# Patient Record
Sex: FEMALE | Race: WHITE | HISPANIC OR LATINO | Employment: OTHER | URBAN - METROPOLITAN AREA
[De-identification: names, ages, dates, MRNs, and addresses within clinical notes are randomized per-mention and may not be internally consistent; named-entity substitution may affect disease eponyms.]

---

## 2022-07-06 ENCOUNTER — HOSPITAL ENCOUNTER (OUTPATIENT)
Dept: RADIOLOGY | Facility: MEDICAL CENTER | Age: 62
End: 2022-07-06
Attending: STUDENT IN AN ORGANIZED HEALTH CARE EDUCATION/TRAINING PROGRAM
Payer: COMMERCIAL

## 2022-07-06 DIAGNOSIS — N63.0 MASS OF BREAST: ICD-10-CM

## 2022-07-06 PROCEDURE — G0279 TOMOSYNTHESIS, MAMMO: HCPCS

## 2022-11-11 ENCOUNTER — TELEPHONE (OUTPATIENT)
Dept: HEALTH INFORMATION MANAGEMENT | Facility: OTHER | Age: 62
End: 2022-11-11
Payer: COMMERCIAL

## 2022-11-11 NOTE — TELEPHONE ENCOUNTER
"Received a call from [facility] for [facility]. [facility] had Soledad on speaker. HIPAA verified and per Soledad \"no\" to the covid screening. Per [facility], effective date is 01/01/23. Scheduled QSHA on 11/14/22 at 2 pm with Dr. Calixto at [address] [place] [address]. Soledad will call back throughout this month and next month to find a provider at [place]. No other questions or concerns.  "

## 2022-11-14 PROBLEM — R73.02 IGT (IMPAIRED GLUCOSE TOLERANCE): Status: ACTIVE | Noted: 2022-11-14

## 2022-11-14 PROBLEM — M19.041 PRIMARY OSTEOARTHRITIS OF BOTH HANDS: Status: ACTIVE | Noted: 2022-11-14

## 2022-11-14 PROBLEM — M19.042 PRIMARY OSTEOARTHRITIS OF BOTH HANDS: Status: ACTIVE | Noted: 2022-11-14

## 2022-11-14 PROBLEM — Z90.49 HX OF CHOLECYSTECTOMY: Status: ACTIVE | Noted: 2022-11-14

## 2022-11-14 PROBLEM — K21.9 GERD (GASTROESOPHAGEAL REFLUX DISEASE): Status: ACTIVE | Noted: 2022-11-14

## 2022-12-02 ENCOUNTER — OFFICE VISIT (OUTPATIENT)
Dept: URGENT CARE | Facility: CLINIC | Age: 62
End: 2022-12-02
Payer: COMMERCIAL

## 2022-12-02 VITALS
HEART RATE: 68 BPM | HEIGHT: 62 IN | SYSTOLIC BLOOD PRESSURE: 118 MMHG | BODY MASS INDEX: 23.37 KG/M2 | TEMPERATURE: 98 F | DIASTOLIC BLOOD PRESSURE: 72 MMHG | RESPIRATION RATE: 16 BRPM | OXYGEN SATURATION: 95 % | WEIGHT: 127 LBS

## 2022-12-02 DIAGNOSIS — R05.1 ACUTE COUGH: ICD-10-CM

## 2022-12-02 DIAGNOSIS — J98.8 RTI (RESPIRATORY TRACT INFECTION): ICD-10-CM

## 2022-12-02 PROCEDURE — 99213 OFFICE O/P EST LOW 20 MIN: CPT | Performed by: STUDENT IN AN ORGANIZED HEALTH CARE EDUCATION/TRAINING PROGRAM

## 2022-12-02 RX ORDER — BENZONATATE 100 MG/1
100 CAPSULE ORAL 3 TIMES DAILY PRN
Qty: 60 CAPSULE | Refills: 0 | Status: SHIPPED | OUTPATIENT
Start: 2022-12-02 | End: 2023-01-26

## 2022-12-02 RX ORDER — AZITHROMYCIN 250 MG/1
TABLET, FILM COATED ORAL
Qty: 6 TABLET | Refills: 0 | Status: SHIPPED | OUTPATIENT
Start: 2022-12-02 | End: 2023-01-26

## 2022-12-02 ASSESSMENT — ENCOUNTER SYMPTOMS
NAUSEA: 0
VOMITING: 0
SHORTNESS OF BREATH: 0
HEADACHES: 1
HEARTBURN: 0
DIZZINESS: 0
COUGH: 1
SORE THROAT: 1
WHEEZING: 0
SPUTUM PRODUCTION: 1
ABDOMINAL PAIN: 0
PALPITATIONS: 0
DIARRHEA: 0
MYALGIAS: 0
HEMOPTYSIS: 0
FEVER: 0
CONSTIPATION: 0
CHILLS: 0

## 2022-12-02 NOTE — PROGRESS NOTES
"Subjective     Soledad Jordan is a 62 y.o. female who presents with Cough (Cough , lots of phlegm , loss of voice , headache x 1 week )            Cough  This is a new problem. Episode onset: 1 week. The problem has been unchanged. The cough is Productive of sputum. Associated symptoms include headaches, nasal congestion, postnasal drip and a sore throat. Pertinent negatives include no chest pain, chills, ear congestion, ear pain, fever, heartburn, hemoptysis, myalgias, shortness of breath or wheezing. Treatments tried: DayQuil, humidifer, honey. The treatment provided mild relief.     Review of Systems   Constitutional:  Negative for chills, fever and malaise/fatigue.   HENT:  Positive for congestion, postnasal drip and sore throat. Negative for ear pain.    Respiratory:  Positive for cough and sputum production. Negative for hemoptysis, shortness of breath and wheezing.    Cardiovascular:  Negative for chest pain and palpitations.   Gastrointestinal:  Negative for abdominal pain, constipation, diarrhea, heartburn, nausea and vomiting.   Musculoskeletal:  Negative for myalgias.   Neurological:  Positive for headaches. Negative for dizziness.   All other systems reviewed and are negative.           Objective     /72 (BP Location: Left arm, Patient Position: Sitting, BP Cuff Size: Adult)   Pulse 68   Temp 36.7 °C (98 °F) (Temporal)   Resp 16   Ht 1.575 m (5' 2\")   Wt 57.6 kg (127 lb)   SpO2 95%   BMI 23.23 kg/m²      Physical Exam  Vitals reviewed.   Constitutional:       General: She is not in acute distress.     Appearance: Normal appearance. She is not ill-appearing or toxic-appearing.   HENT:      Head: Normocephalic and atraumatic.      Nose: Nose normal.      Mouth/Throat:      Mouth: Mucous membranes are moist.      Pharynx: Oropharynx is clear.   Eyes:      Extraocular Movements: Extraocular movements intact.      Conjunctiva/sclera: Conjunctivae normal.      Pupils: Pupils are equal, round, " and reactive to light.   Cardiovascular:      Rate and Rhythm: Normal rate and regular rhythm.   Pulmonary:      Effort: Pulmonary effort is normal. No respiratory distress.      Breath sounds: Normal breath sounds. No stridor. No wheezing, rhonchi or rales.   Skin:     General: Skin is warm and dry.   Neurological:      General: No focal deficit present.      Mental Status: She is alert. Mental status is at baseline.                           Assessment & Plan        1. RTI (respiratory tract infection)  - azithromycin (ZITHROMAX) 250 MG Tab; Take 2 tablets by mouth on day one. Take one tablet by mouth the remaining days until gone  Dispense: 6 Tablet; Refill: 0    2. Acute cough  - benzonatate (TESSALON) 100 MG Cap; Take 1 Capsule by mouth 3 times a day as needed for Cough.  Dispense: 60 Capsule; Refill: 0     Given duration of symptoms of 1 week without improvement with supportive care measures, patient be started on azithromycin.  Patient otherwise well-appearing in clinic today and physical exam within normal limits.  Vital signs are stable.    Differential diagnoses, supportive care measures (rest, hydration, nasal saline rinses/sprays, throat lozenges, OTC Tylenol/ibuprofen, humidified air) and indications for immediate follow-up discussed with patient. Pathogenesis of diagnosis discussed including typical length and natural progression.      Instructed to return to urgent care or nearest emergency department if symptoms fail to improve, for any change in condition, further concerns, or new concerning symptoms.    Patient states understanding and agrees with the plan of care and discharge instructions.

## 2023-01-26 ENCOUNTER — OFFICE VISIT (OUTPATIENT)
Dept: MEDICAL GROUP | Facility: PHYSICIAN GROUP | Age: 63
End: 2023-01-26
Payer: MEDICARE

## 2023-01-26 VITALS
SYSTOLIC BLOOD PRESSURE: 118 MMHG | OXYGEN SATURATION: 98 % | BODY MASS INDEX: 23.05 KG/M2 | RESPIRATION RATE: 16 BRPM | WEIGHT: 125.25 LBS | HEART RATE: 61 BPM | TEMPERATURE: 98 F | HEIGHT: 62 IN | DIASTOLIC BLOOD PRESSURE: 72 MMHG

## 2023-01-26 DIAGNOSIS — M19.041 PRIMARY OSTEOARTHRITIS OF BOTH HANDS: ICD-10-CM

## 2023-01-26 DIAGNOSIS — N89.8 VAGINAL DRYNESS: ICD-10-CM

## 2023-01-26 DIAGNOSIS — K21.9 GASTROESOPHAGEAL REFLUX DISEASE WITHOUT ESOPHAGITIS: ICD-10-CM
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
## 2023-01-26 DIAGNOSIS — Z13.6 SCREENING FOR CARDIOVASCULAR CONDITION: ICD-10-CM

## 2023-01-26 DIAGNOSIS — R53.83 OTHER FATIGUE: ICD-10-CM

## 2023-01-26 DIAGNOSIS — M19.042 PRIMARY OSTEOARTHRITIS OF BOTH HANDS: ICD-10-CM

## 2023-01-26 DIAGNOSIS — Z23 NEED FOR VACCINATION: ICD-10-CM

## 2023-01-26 DIAGNOSIS — R73.02 IGT (IMPAIRED GLUCOSE TOLERANCE): ICD-10-CM

## 2023-01-26 DIAGNOSIS — Z78.0 POSTMENOPAUSAL: ICD-10-CM

## 2023-01-26 PROBLEM — Z90.49 HX OF CHOLECYSTECTOMY: Status: RESOLVED | Noted: 2022-11-14 | Resolved: 2023-01-26
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
## 2023-01-26 PROCEDURE — 90471 IMMUNIZATION ADMIN: CPT | Performed by: PHYSICIAN ASSISTANT

## 2023-01-26 PROCEDURE — 99204 OFFICE O/P NEW MOD 45 MIN: CPT | Mod: 25 | Performed by: PHYSICIAN ASSISTANT

## 2023-01-26 PROCEDURE — 90715 TDAP VACCINE 7 YRS/> IM: CPT | Performed by: PHYSICIAN ASSISTANT

## 2023-01-26 ASSESSMENT — ENCOUNTER SYMPTOMS
BRUISES/BLEEDS EASILY: 0
HEADACHES: 0
FEVER: 0
DIARRHEA: 0
COUGH: 0
PALPITATIONS: 0
VOMITING: 0
DIAPHORESIS: 0
MYALGIAS: 0
WEAKNESS: 0
ORTHOPNEA: 0
SHORTNESS OF BREATH: 0
DIZZINESS: 0
ABDOMINAL PAIN: 0
BLURRED VISION: 0
NAUSEA: 0
CHILLS: 0
NERVOUS/ANXIOUS: 0
DEPRESSION: 0
FALLS: 0
WEIGHT LOSS: 0
SORE THROAT: 0

## 2023-01-26 ASSESSMENT — PATIENT HEALTH QUESTIONNAIRE - PHQ9: CLINICAL INTERPRETATION OF PHQ2 SCORE: 0

## 2023-01-26 NOTE — PROGRESS NOTES
Annual Health Assessment Questions:    1.  Are you currently engaging in any exercise or physical activity? Yes, walking    2.  How would you describe your mood or emotional well-being today? good    3.  Have you had any falls in the last year? Yes    4.  Have you noticed any problems with your balance or had difficulty walking? No    5.  In the last six months have you experienced any leakage of urine? No    6. DPA/Advanced Directive: Patient has Advance Directive, but it is not on file. Instructed to bring in a copy to scan into their chart.

## 2023-01-26 NOTE — PROGRESS NOTES
Subjective:     CC: Martinez is a new patient.    HPI:   Soledad presents today with    Problem   Postmenopausal    1994, total hysterectomy.     Fall    GLF about a year ago.  Hurt right shoulder and right elbow.  Still has occasional pain.  Declines PT for now.     Vaginal Dryness    Chronic, uncontrolled.  Reports somewhat frequent vaginal itching/irritation, worse with intercourse.  Total hysterectomy 1994.  Was on oral hormones for years but has been off of them for a long time.  Has tried vaginal estrogen in the past but it was very expensive.  She ended up getting it in [place].  She thinks she may have 1 tube left but otherwise declines vaginal estrogen at this time.  She states that she goes back to [place] she will buy some more.     Gerd (Gastroesophageal Reflux Disease)    Chronic, stable.  Tolerating omeprazole 20mg daily.  Takes it daily.  Started around 2015.  Started by GI doctor after EKD.  Famotidine did NOT help with the symptoms.     Primary Osteoarthritis of Both Hands    Chronic, stable.  Tolerating ibuprofen/apap as needed and it helps.         Health Maintenance: Completed    ROS:  Review of Systems   Constitutional:  Positive for malaise/fatigue (Babysits her 2-year-old and 6-month-old granddaughter). Negative for chills, diaphoresis, fever and weight loss.   HENT:  Negative for hearing loss and sore throat.    Eyes:  Negative for blurred vision.   Respiratory:  Negative for cough and shortness of breath.    Cardiovascular:  Negative for chest pain, palpitations, orthopnea and leg swelling.   Gastrointestinal:  Negative for abdominal pain, diarrhea, nausea and vomiting.   Genitourinary:  Negative for dysuria, frequency, hematuria and urgency.   Musculoskeletal:  Negative for falls, joint pain and myalgias.   Skin:  Negative for rash.   Neurological:  Negative for dizziness, weakness and headaches.   Endo/Heme/Allergies:  Does not bruise/bleed easily.   Psychiatric/Behavioral:  Negative for  "depression. The patient is not nervous/anxious.      Objective:     Exam:  /72 (BP Location: Left arm, Patient Position: Sitting, BP Cuff Size: Adult)   Pulse 61   Temp 36.7 °C (98 °F) (Temporal)   Resp 16   Ht 1.575 m (5' 2\")   Wt 56.8 kg (125 lb 4 oz)   SpO2 98%   Breastfeeding No   BMI 22.91 kg/m²  Body mass index is 22.91 kg/m².    Physical Exam  Constitutional:       Appearance: Normal appearance.   HENT:      Head: Normocephalic and atraumatic.      Right Ear: Tympanic membrane, ear canal and external ear normal.      Left Ear: Tympanic membrane, ear canal and external ear normal.      Mouth/Throat:      Mouth: Mucous membranes are moist.      Pharynx: Oropharynx is clear. No oropharyngeal exudate or posterior oropharyngeal erythema.   Eyes:      Extraocular Movements: Extraocular movements intact.      Conjunctiva/sclera: Conjunctivae normal.      Pupils: Pupils are equal, round, and reactive to light.   Cardiovascular:      Rate and Rhythm: Normal rate and regular rhythm.      Heart sounds: Normal heart sounds.   Pulmonary:      Effort: Pulmonary effort is normal.      Breath sounds: Normal breath sounds.   Abdominal:      General: Abdomen is flat. Bowel sounds are normal. There is no distension.      Palpations: Abdomen is soft. There is no mass.      Tenderness: There is no abdominal tenderness. There is no guarding.   Musculoskeletal:      Cervical back: Normal range of motion and neck supple.   Skin:     General: Skin is warm and dry.   Neurological:      General: No focal deficit present.      Mental Status: She is alert and oriented to person, place, and time.   Psychiatric:         Mood and Affect: Mood normal.     Assessment & Plan:     62 y.o. female with the following -     1. Gastroesophageal reflux disease without esophagitis  Chronic, controlled.  Continue omeprazole 20 mg daily.  No longer sees GI.  Due for colonoscopy 2025.    2. IGT (impaired glucose tolerance)  Chronic, " controlled.  Repeat A1c.    - HEMOGLOBIN A1C; Future    3. Postmenopausal  Chronic, controlled.  DEXA ordered.    - DS-BONE DENSITY STUDY (DEXA); Future    4. Vaginal dryness  Chronic, uncontrolled.  Declines vaginal estrogen at this time due to cost.    5. Primary osteoarthritis of both hands  Chronic, controlled.  Continue over-the-counter ibuprofen and acetaminophen as needed for pain.    6. Fall, sequela  Fell 1 year ago with minor injury because she tripped.  No complaints at this time.    7. Screening for cardiovascular condition    - Lipid Profile; Future    8. Other fatigue    - CBC WITH DIFFERENTIAL; Future  - Comp Metabolic Panel; Future  - TSH WITH REFLEX TO FT4; Future    9. Need for vaccination    - Tdap Vaccine =>6YO IM        Return in about 1 month (around 2/26/2023) for discuss labs.    Please note that this dictation was created using voice recognition software. I have made every reasonable attempt to correct obvious errors, but I expect that there are errors of grammar and possibly content that I did not discover before finalizing the note.

## 2023-01-27 ENCOUNTER — HOSPITAL ENCOUNTER (OUTPATIENT)
Dept: RADIOLOGY | Facility: MEDICAL CENTER | Age: 63
End: 2023-01-27
Attending: PHYSICIAN ASSISTANT
Payer: MEDICARE

## 2023-01-27 DIAGNOSIS — Z78.0 POSTMENOPAUSAL: ICD-10-CM

## 2023-01-27 PROCEDURE — 77080 DXA BONE DENSITY AXIAL: CPT

## 2023-01-30 PROBLEM — M81.0 AGE-RELATED OSTEOPOROSIS WITHOUT CURRENT PATHOLOGICAL FRACTURE: Status: ACTIVE | Noted: 2023-01-30

## 2023-02-09 ENCOUNTER — HOSPITAL ENCOUNTER (OUTPATIENT)
Dept: LAB | Facility: MEDICAL CENTER | Age: 63
End: 2023-02-09
Attending: PHYSICIAN ASSISTANT
Payer: MEDICARE

## 2023-02-09 DIAGNOSIS — R73.02 IGT (IMPAIRED GLUCOSE TOLERANCE): ICD-10-CM

## 2023-02-09 DIAGNOSIS — Z13.6 SCREENING FOR CARDIOVASCULAR CONDITION: ICD-10-CM

## 2023-02-09 DIAGNOSIS — R53.83 OTHER FATIGUE: ICD-10-CM

## 2023-02-09 LAB
ALBUMIN SERPL BCP-MCNC: 4.9 G/DL (ref 3.2–4.9)
ALBUMIN/GLOB SERPL: 1.8 G/DL
ALP SERPL-CCNC: 65 U/L (ref 30–99)
ALT SERPL-CCNC: 18 U/L (ref 2–50)
ANION GAP SERPL CALC-SCNC: 8 MMOL/L (ref 7–16)
AST SERPL-CCNC: 24 U/L (ref 12–45)
BASOPHILS # BLD AUTO: 0.5 % (ref 0–1.8)
BASOPHILS # BLD: 0.02 K/UL (ref 0–0.12)
BILIRUB SERPL-MCNC: 0.5 MG/DL (ref 0.1–1.5)
BUN SERPL-MCNC: 11 MG/DL (ref 8–22)
CALCIUM ALBUM COR SERPL-MCNC: 9.1 MG/DL (ref 8.5–10.5)
CALCIUM SERPL-MCNC: 9.8 MG/DL (ref 8.5–10.5)
CHLORIDE SERPL-SCNC: 101 MMOL/L (ref 96–112)
CHOLEST SERPL-MCNC: 230 MG/DL (ref 100–199)
CO2 SERPL-SCNC: 27 MMOL/L (ref 20–33)
CREAT SERPL-MCNC: 0.64 MG/DL (ref 0.5–1.4)
EOSINOPHIL # BLD AUTO: 0.04 K/UL (ref 0–0.51)
EOSINOPHIL NFR BLD: 1 % (ref 0–6.9)
ERYTHROCYTE [DISTWIDTH] IN BLOOD BY AUTOMATED COUNT: 43.6 FL (ref 35.9–50)
EST. AVERAGE GLUCOSE BLD GHB EST-MCNC: 123 MG/DL
FASTING STATUS PATIENT QL REPORTED: NORMAL
GFR SERPLBLD CREATININE-BSD FMLA CKD-EPI: 98 ML/MIN/1.73 M 2
GLOBULIN SER CALC-MCNC: 2.7 G/DL (ref 1.9–3.5)
GLUCOSE SERPL-MCNC: 102 MG/DL (ref 65–99)
HBA1C MFR BLD: 5.9 % (ref 4–5.6)
HCT VFR BLD AUTO: 41.8 % (ref 37–47)
HDLC SERPL-MCNC: 63 MG/DL
HGB BLD-MCNC: 13.8 G/DL (ref 12–16)
IMM GRANULOCYTES # BLD AUTO: 0.01 K/UL (ref 0–0.11)
IMM GRANULOCYTES NFR BLD AUTO: 0.2 % (ref 0–0.9)
LDLC SERPL CALC-MCNC: 150 MG/DL
LYMPHOCYTES # BLD AUTO: 1.82 K/UL (ref 1–4.8)
LYMPHOCYTES NFR BLD: 43.5 % (ref 22–41)
MCH RBC QN AUTO: 31.7 PG (ref 27–33)
MCHC RBC AUTO-ENTMCNC: 33 G/DL (ref 33.6–35)
MCV RBC AUTO: 95.9 FL (ref 81.4–97.8)
MONOCYTES # BLD AUTO: 0.3 K/UL (ref 0–0.85)
MONOCYTES NFR BLD AUTO: 7.2 % (ref 0–13.4)
NEUTROPHILS # BLD AUTO: 1.99 K/UL (ref 2–7.15)
NEUTROPHILS NFR BLD: 47.6 % (ref 44–72)
NRBC # BLD AUTO: 0 K/UL
NRBC BLD-RTO: 0 /100 WBC
PLATELET # BLD AUTO: 309 K/UL (ref 164–446)
PMV BLD AUTO: 10.1 FL (ref 9–12.9)
POTASSIUM SERPL-SCNC: 4.2 MMOL/L (ref 3.6–5.5)
PROT SERPL-MCNC: 7.6 G/DL (ref 6–8.2)
RBC # BLD AUTO: 4.36 M/UL (ref 4.2–5.4)
SODIUM SERPL-SCNC: 136 MMOL/L (ref 135–145)
TRIGL SERPL-MCNC: 86 MG/DL (ref 0–149)
TSH SERPL DL<=0.005 MIU/L-ACNC: 1.1 UIU/ML (ref 0.38–5.33)
WBC # BLD AUTO: 4.2 K/UL (ref 4.8–10.8)

## 2023-02-09 PROCEDURE — 80053 COMPREHEN METABOLIC PANEL: CPT

## 2023-02-09 PROCEDURE — 80061 LIPID PANEL: CPT

## 2023-02-09 PROCEDURE — 83036 HEMOGLOBIN GLYCOSYLATED A1C: CPT

## 2023-02-09 PROCEDURE — 84443 ASSAY THYROID STIM HORMONE: CPT

## 2023-02-09 PROCEDURE — 85025 COMPLETE CBC W/AUTO DIFF WBC: CPT

## 2023-02-09 PROCEDURE — 36415 COLL VENOUS BLD VENIPUNCTURE: CPT

## 2023-03-01 ENCOUNTER — OFFICE VISIT (OUTPATIENT)
Dept: MEDICAL GROUP | Facility: PHYSICIAN GROUP | Age: 63
End: 2023-03-01
Payer: MEDICARE

## 2023-03-01 VITALS
BODY MASS INDEX: 23.42 KG/M2 | TEMPERATURE: 97.7 F | WEIGHT: 127.25 LBS | HEIGHT: 62 IN | DIASTOLIC BLOOD PRESSURE: 70 MMHG | OXYGEN SATURATION: 99 % | HEART RATE: 68 BPM | SYSTOLIC BLOOD PRESSURE: 98 MMHG | RESPIRATION RATE: 16 BRPM

## 2023-03-01 DIAGNOSIS — K21.9 GASTROESOPHAGEAL REFLUX DISEASE WITHOUT ESOPHAGITIS: ICD-10-CM

## 2023-03-01 DIAGNOSIS — R73.02 IGT (IMPAIRED GLUCOSE TOLERANCE): ICD-10-CM

## 2023-03-01 DIAGNOSIS — D72.819 LEUKOPENIA, UNSPECIFIED TYPE: ICD-10-CM

## 2023-03-01 DIAGNOSIS — M81.0 AGE-RELATED OSTEOPOROSIS WITHOUT CURRENT PATHOLOGICAL FRACTURE: ICD-10-CM

## 2023-03-01 DIAGNOSIS — E78.5 DYSLIPIDEMIA: ICD-10-CM

## 2023-03-01 PROCEDURE — 99214 OFFICE O/P EST MOD 30 MIN: CPT | Performed by: PHYSICIAN ASSISTANT

## 2023-03-01 RX ORDER — ALENDRONATE SODIUM 70 MG/1
70 TABLET ORAL
Qty: 12 TABLET | Refills: 3 | Status: SHIPPED | OUTPATIENT
Start: 2023-03-01 | End: 2024-01-18 | Stop reason: SDUPTHER

## 2023-03-01 RX ORDER — TRIAMCINOLONE ACETONIDE 0.25 MG/G
CREAM TOPICAL
COMMUNITY

## 2023-03-01 RX ORDER — OMEPRAZOLE 20 MG/1
CAPSULE, DELAYED RELEASE ORAL
Qty: 90 CAPSULE | Refills: 3 | Status: SHIPPED | OUTPATIENT
Start: 2023-03-01 | End: 2024-02-08 | Stop reason: SDUPTHER

## 2023-03-01 ASSESSMENT — ENCOUNTER SYMPTOMS
FEVER: 0
SHORTNESS OF BREATH: 0
CHILLS: 0

## 2023-03-01 ASSESSMENT — FIBROSIS 4 INDEX: FIB4 SCORE: 1.19

## 2023-03-01 NOTE — PROGRESS NOTES
"Subjective:     CC: Lab/chronic issue follow-up    HPI:   Soledad presents today with    Problem   Dyslipidemia    Chronic, uncontrolled.  Latest Labs:   Lab Results   Component Value Date/Time    CHOLSTRLTOT 230 (H) 02/09/2023 07:56 AM     (H) 02/09/2023 07:56 AM    HDL 63 02/09/2023 07:56 AM    TRIGLYCERIDE 86 02/09/2023 07:56 AM      Medications: Defer medication for now  Diet/Exercise: Discussed increasing plant-based foods and decreasing animal-based and processed foods.  Risk calculator: The 10-year ASCVD risk score (João REY, et al., 2019) is: 3.4%        Leukopenia    New finding, 2/9/2023  WBC 4.2     Age-Related Osteoporosis Without Current Pathological Fracture    Chronic, uncontrolled.  DEXA, 1/2023:  L spine T score -3.0  Left femur T score -0.7    Starting Fosamax 3/1/2023.  Will discontinue Fosamax 3/1/2028..       Gerd (Gastroesophageal Reflux Disease)    Chronic, stable.  Tolerating omeprazole 20mg daily.  Takes it daily.  Started around 2015.  Started by GI doctor after EKD.  Famotidine did NOT help with the symptoms.       Igt (Impaired Glucose Tolerance)    Chronic, stable.  A1c 5.9, 2/9/2023.  Discussed increasing plant-based foods, decreasing animal-based and processed foods.  Repeat labs in 6 months.         Health Maintenance: Completed    ROS:  Review of Systems   Constitutional:  Negative for chills and fever.   Respiratory:  Negative for shortness of breath.    Cardiovascular:  Negative for chest pain.     Objective:     Exam:  BP 98/70 (BP Location: Left arm, Patient Position: Sitting, BP Cuff Size: Adult)   Pulse 68   Temp 36.5 °C (97.7 °F) (Temporal)   Resp 16   Ht 1.575 m (5' 2\")   Wt 57.7 kg (127 lb 4 oz)   SpO2 99%   Breastfeeding No   BMI 23.27 kg/m²  Body mass index is 23.27 kg/m².    Physical Exam  Constitutional:       Appearance: Normal appearance.   Cardiovascular:      Rate and Rhythm: Normal rate and regular rhythm.      Heart sounds: Normal heart sounds. "   Pulmonary:      Effort: Pulmonary effort is normal.      Breath sounds: Normal breath sounds.   Musculoskeletal:      Cervical back: Normal range of motion and neck supple.   Skin:     General: Skin is warm.   Neurological:      General: No focal deficit present.      Mental Status: She is alert.   Psychiatric:         Mood and Affect: Mood normal.       Assessment & Plan:     63 y.o. female with the following -     1. Dyslipidemia  Chronic, uncontrolled.  Discussed ASCVD risk score.  The 10-year ASCVD risk score (João DK, et al., 2019) is: 3.4%  Deferring medication for now.  Discussed increasing plant-based foods, decreasing animal-based foods.  Increase daily activity, aiming for 30-45 minutes brisk exercise daily.    - Lipid Profile; Future    2. Leukopenia, unspecified type  Chronic, mildly decreased.  WBC 4.2.  Repeat in 6 months.    - CBC WITHOUT DIFFERENTIAL; Future    3. IGT (impaired glucose tolerance)  Chronic, controlled.  A1c 5.9.  Repeat 6 months.    - HEMOGLOBIN A1C; Future    4. Age-related osteoporosis without current pathological fracture  Chronic, uncontrolled.  Starting Fosamax.    - alendronate (FOSAMAX) 70 MG Tab; Take 1 Tablet by mouth every 7 days.  Dispense: 12 Tablet; Refill: 3    5. Gastroesophageal reflux disease without esophagitis  Chronic, stable.  Continue omeprazole 20 mg daily.  - omeprazole (PRILOSEC) 20 MG delayed-release capsule; TAKE 1 CAPSULE BY MOUTH ONCE DAILY 30 MINUTES BEFORE MORNING MEAL  Dispense: 90 Capsule; Refill: 3        Return in about 6 months (around 9/1/2023) for med check, discuss labs.    Please note that this dictation was created using voice recognition software. I have made every reasonable attempt to correct obvious errors, but I expect that there are errors of grammar and possibly content that I did not discover before finalizing the note.

## 2023-05-04 ENCOUNTER — TELEPHONE (OUTPATIENT)
Dept: HEALTH INFORMATION MANAGEMENT | Facility: OTHER | Age: 63
End: 2023-05-04
Payer: MEDICARE

## 2023-08-16 ENCOUNTER — HOSPITAL ENCOUNTER (OUTPATIENT)
Dept: LAB | Facility: MEDICAL CENTER | Age: 63
End: 2023-08-16
Attending: PHYSICIAN ASSISTANT
Payer: MEDICARE

## 2023-08-16 DIAGNOSIS — R73.02 IGT (IMPAIRED GLUCOSE TOLERANCE): ICD-10-CM

## 2023-08-16 DIAGNOSIS — D72.819 LEUKOPENIA, UNSPECIFIED TYPE: ICD-10-CM

## 2023-08-16 DIAGNOSIS — E78.5 DYSLIPIDEMIA: ICD-10-CM

## 2023-08-16 LAB
CHOLEST SERPL-MCNC: 206 MG/DL (ref 100–199)
ERYTHROCYTE [DISTWIDTH] IN BLOOD BY AUTOMATED COUNT: 44.3 FL (ref 35.9–50)
EST. AVERAGE GLUCOSE BLD GHB EST-MCNC: 120 MG/DL
FASTING STATUS PATIENT QL REPORTED: NORMAL
HBA1C MFR BLD: 5.8 % (ref 4–5.6)
HCT VFR BLD AUTO: 41.1 % (ref 37–47)
HDLC SERPL-MCNC: 65 MG/DL
HGB BLD-MCNC: 13.4 G/DL (ref 12–16)
LDLC SERPL CALC-MCNC: 130 MG/DL
MCH RBC QN AUTO: 31.2 PG (ref 27–33)
MCHC RBC AUTO-ENTMCNC: 32.6 G/DL (ref 32.2–35.5)
MCV RBC AUTO: 95.6 FL (ref 81.4–97.8)
PLATELET # BLD AUTO: 296 K/UL (ref 164–446)
PMV BLD AUTO: 10.3 FL (ref 9–12.9)
RBC # BLD AUTO: 4.3 M/UL (ref 4.2–5.4)
TRIGL SERPL-MCNC: 57 MG/DL (ref 0–149)
WBC # BLD AUTO: 6.1 K/UL (ref 4.8–10.8)

## 2023-08-16 PROCEDURE — 85027 COMPLETE CBC AUTOMATED: CPT

## 2023-08-16 PROCEDURE — 83036 HEMOGLOBIN GLYCOSYLATED A1C: CPT

## 2023-08-16 PROCEDURE — 80061 LIPID PANEL: CPT

## 2023-08-16 PROCEDURE — 36415 COLL VENOUS BLD VENIPUNCTURE: CPT

## 2023-09-01 ENCOUNTER — HOSPITAL ENCOUNTER (OUTPATIENT)
Dept: RADIOLOGY | Facility: MEDICAL CENTER | Age: 63
End: 2023-09-01
Attending: PHYSICIAN ASSISTANT
Payer: MEDICARE

## 2023-09-01 ENCOUNTER — OFFICE VISIT (OUTPATIENT)
Dept: MEDICAL GROUP | Facility: PHYSICIAN GROUP | Age: 63
End: 2023-09-01
Payer: MEDICARE

## 2023-09-01 VITALS
RESPIRATION RATE: 16 BRPM | HEART RATE: 64 BPM | DIASTOLIC BLOOD PRESSURE: 62 MMHG | BODY MASS INDEX: 24.22 KG/M2 | HEIGHT: 62 IN | WEIGHT: 131.6 LBS | TEMPERATURE: 97.3 F | OXYGEN SATURATION: 98 % | SYSTOLIC BLOOD PRESSURE: 128 MMHG

## 2023-09-01 DIAGNOSIS — D72.819 LEUKOPENIA, UNSPECIFIED TYPE: ICD-10-CM

## 2023-09-01 DIAGNOSIS — G89.29 CHRONIC LEFT SI JOINT PAIN: ICD-10-CM

## 2023-09-01 DIAGNOSIS — R73.02 IGT (IMPAIRED GLUCOSE TOLERANCE): ICD-10-CM

## 2023-09-01 DIAGNOSIS — M79.605 LEFT LEG PAIN: ICD-10-CM

## 2023-09-01 DIAGNOSIS — M53.3 CHRONIC LEFT SI JOINT PAIN: ICD-10-CM

## 2023-09-01 DIAGNOSIS — E78.5 DYSLIPIDEMIA: ICD-10-CM

## 2023-09-01 DIAGNOSIS — N89.8 VAGINAL IRRITATION: ICD-10-CM

## 2023-09-01 DIAGNOSIS — R53.83 OTHER FATIGUE: ICD-10-CM

## 2023-09-01 PROCEDURE — 3078F DIAST BP <80 MM HG: CPT | Performed by: PHYSICIAN ASSISTANT

## 2023-09-01 PROCEDURE — 3074F SYST BP LT 130 MM HG: CPT | Performed by: PHYSICIAN ASSISTANT

## 2023-09-01 PROCEDURE — 99214 OFFICE O/P EST MOD 30 MIN: CPT | Performed by: PHYSICIAN ASSISTANT

## 2023-09-01 PROCEDURE — 73502 X-RAY EXAM HIP UNI 2-3 VIEWS: CPT | Mod: LT

## 2023-09-01 RX ORDER — FLUCONAZOLE 150 MG/1
TABLET ORAL
Qty: 5 TABLET | Refills: 0 | Status: SHIPPED | OUTPATIENT
Start: 2023-09-01

## 2023-09-01 ASSESSMENT — ENCOUNTER SYMPTOMS
CHILLS: 0
FEVER: 0
SHORTNESS OF BREATH: 0

## 2023-09-01 ASSESSMENT — FIBROSIS 4 INDEX: FIB4 SCORE: 1.24

## 2023-09-01 NOTE — PROGRESS NOTES
"Subjective:     CC: lab follow up     HPI:   Soledad presents today with    Problem   Left Leg Pain    Chronic, uncontrolled.  Started months ago.  Starts in the left foot, radiating to left leg.  Now left buttock has pain with sitting.  Affecting sleep --> the entire leg feels numb/tingling.  Pain with ambulation.  Will try PT again (tried it on the right side).  Ibuprofen is helping with the pain at night.     Vaginal Irritation    Acute, resolved.  Had symptoms consistent with yeast infection.  Resolved with OTC medications.  Asking for medication to have on hand if she gets it again.     Dyslipidemia    Chronic, uncontrolled.  Latest Labs:   Lab Results   Component Value Date/Time    CHOLSTRLTOT 206 (H) 08/16/2023 06:56 AM     (H) 08/16/2023 06:56 AM    HDL 65 08/16/2023 06:56 AM    TRIGLYCERIDE 57 08/16/2023 06:56 AM      Medications: declines medication for now; history of statin intolerance (muscle pain); can't remember the name.  Diet/Exercise: Discussed increasing plant-based foods and decreasing animal-based and processed foods.  Risk calculator: The 10-year ASCVD risk score (João REY, et al., 2019) is: 3.2%        Igt (Impaired Glucose Tolerance)    Chronic, stable.  A1c 5.9 (2/9/2023)  A1C 5.8 (8/16/2023)  Discussed increasing plant-based foods, decreasing animal-based and processed foods.  Repeat labs in 6 months.           ROS:  Review of Systems   Constitutional:  Negative for chills and fever.   Respiratory:  Negative for shortness of breath.    Cardiovascular:  Negative for chest pain.       Objective:     Exam:  /62   Pulse 64   Temp 36.3 °C (97.3 °F) (Temporal)   Resp 16   Ht 1.575 m (5' 2\")   Wt 59.7 kg (131 lb 9.6 oz)   SpO2 98%   BMI 24.07 kg/m²  Body mass index is 24.07 kg/m².    Physical Exam  Constitutional:       Appearance: Normal appearance.   Cardiovascular:      Rate and Rhythm: Normal rate and regular rhythm.      Heart sounds: Normal heart sounds.   Pulmonary:      " Effort: Pulmonary effort is normal.      Breath sounds: Normal breath sounds.   Musculoskeletal:      Cervical back: Normal range of motion and neck supple.      Comments: Tenderness on palpation of the left SI joint region.  No localized back pain.  Pedal pulses are strong and equal bilaterally.   Skin:     General: Skin is warm.   Neurological:      General: No focal deficit present.      Mental Status: She is alert.   Psychiatric:         Mood and Affect: Mood normal.             Assessment & Plan:     63 y.o. female with the following -     1. Dyslipidemia  Chronic, uncontrolled.  Discussed ASCVD risk score.  Patient really does not want to take a statin.  She has been making significant changes in her diet and has had some improvement in her LDL.  The 10-year ASCVD risk score (João REY, et al., 2019) is: 5.3%  Repeating labs in 6 months.    - Lipid Profile; Future    2. IGT (impaired glucose tolerance)  Chronic, stable.  A1c improved to 5.8 from 5.9.  Repeat labs in 6 months.  Patient has made significant lifestyle changes.    - HEMOGLOBIN A1C; Future    3. Leukopenia, unspecified type  Chronic, stable.  Repeat CBC.    - CBC WITH DIFFERENTIAL; Future    4. Other fatigue  Chronic, intermittent.  Checking labs.    - Comp Metabolic Panel; Future  - TSH WITH REFLEX TO FT4; Future    5. Left leg pain  Chronic, uncontrolled.  Checking x-ray and refer to physical therapy.    - DX-HIP-COMPLETE - UNILATERAL 2+ LEFT; Future  - Referral to Physical Therapy    6. Chronic left SI joint pain  #5.    - DX-HIP-COMPLETE - UNILATERAL 2+ LEFT; Future  - Referral to Physical Therapy    7. Vaginal irritation  Intermittent.  Refilling fluconazole to use as needed.    - fluconazole (DIFLUCAN) 150 MG tablet; Use 1 pill (one dose) as needed for symptoms of yeast infection. May repeat after 3 days if needed.  Dispense: 5 Tablet; Refill: 0        Repeat labs in 6 months.    Return in about 7 months (around 4/1/2024) for lab  discussion.    Please note that this dictation was created using voice recognition software. I have made every reasonable attempt to correct obvious errors, but I expect that there are errors of grammar and possibly content that I did not discover before finalizing the note.

## 2023-09-27 ENCOUNTER — PHYSICAL THERAPY (OUTPATIENT)
Dept: PHYSICAL THERAPY | Facility: REHABILITATION | Age: 63
End: 2023-09-27
Attending: PHYSICIAN ASSISTANT
Payer: MEDICARE

## 2023-09-27 DIAGNOSIS — G89.29 CHRONIC LEFT SI JOINT PAIN: ICD-10-CM

## 2023-09-27 DIAGNOSIS — M53.3 CHRONIC LEFT SI JOINT PAIN: ICD-10-CM

## 2023-09-27 DIAGNOSIS — M79.605 LEFT LEG PAIN: ICD-10-CM

## 2023-09-27 PROCEDURE — 97110 THERAPEUTIC EXERCISES: CPT

## 2023-09-27 PROCEDURE — 97161 PT EVAL LOW COMPLEX 20 MIN: CPT

## 2023-09-27 SDOH — ECONOMIC STABILITY: GENERAL: QUALITY OF LIFE: FAIR

## 2023-09-27 ASSESSMENT — ENCOUNTER SYMPTOMS
PAIN SCALE: 2
PAIN SCALE AT LOWEST: 1
PAIN SCALE AT HIGHEST: 8

## 2023-09-27 NOTE — OP THERAPY EVALUATION
Outpatient Physical Therapy  INITIAL EVALUATION    [facility]  [address], [address]  [place] [address]  Phone:  [phone]  Fax:  [phone]    Date of Evaluation: 2023    Patient: Soledad Jordan  YOB: 1960  MRN: [id number]     Referring Provider: CON Omer[address] [place] [address]   Referring Diagnosis Left leg pain [M79.605];Chronic left SI joint pain [M53.3, G89.29]     Time Calculation  Start time: 730  Stop time: 0815 Time Calculation (min): 45 minutes         Chief Complaint: Leg Problem    Visit Diagnoses     ICD-10-CM   1. Left leg pain  M79.605   2. Chronic left SI joint pain  M53.3    G89.29       Date of onset of impairment: 2023    Subjective:   History of Present Illness:     Date of onset:  2023  Quality of life:  Fair  Prior level of function:  New onset L leg pain  Pain:     Current pain ratin    At best pain ratin    At worst pain ratin  Diagnostic Tests:     X-ray: abnormal    Activities of Daily Living:     Patient reported ADL status: Limited flexion  Limited ability to squat   Limited sitting tolerance  Limited housework tolerance  Patient Goals:     Patient goals for therapy:  Increased strength, increased motion and decreased pain  Patient is a 63 y.o. female that presents to therapy with L leg pain. States that symptoms were insidious in onset. Reports the pain quality to be sharp/dull, constant and are primarily L leg. Reports that symptoms now worsening. States that aggravating factors are bending, sitting.  States that easng factors are supine 90/90 and meds. Notes numbness and tingling in the L LE, denies red flags.       Past Medical History:   Diagnosis Date    Dyslipidemia     GERD (gastroesophageal reflux disease)     H/O mammogram 2013    normal    Pap smear for cervical cancer screening 2012    normal    PUD (peptic ulcer disease)      Past Surgical History:    Procedure Laterality Date    OTHER ORTHOPEDIC SURGERY  01/01/2009    left shoulder    OTHER ORTHOPEDIC SURGERY  01/01/2005    left knee    ABDOMINAL HYSTERECTOMY TOTAL      ovaries/uterus    APPENDECTOMY      CHOLECYSTECTOMY      OTHER ORTHOPEDIC SURGERY Left     bunion left foot     Social History     Tobacco Use    Smoking status: Never    Smokeless tobacco: Never   Substance Use Topics    Alcohol use: No     Family and Occupational History     Socioeconomic History    Marital status:      Spouse name: Not on file    Number of children: Not on file    Years of education: Not on file    Highest education level: Not on file   Occupational History    Not on file       Objective     Postural Observations  Seated posture: poor  Standing posture: poor      Neurological Testing     Reflexes   Left   Patellar (L4): normal (2+)  Achilles (S1): trace (1+)  Ankle clonus reflex: negative  Babinski sign: negative    Right   Patellar (L4): normal (2+)  Achilles (S1): trace (1+)  Ankle clonus reflex: negative  Babinski sign: negative    Myotome testing   Lumbar (left)   L1 (hip flexors): 5  L2 (hip flexors): 5  L3 (knee extensors): 5  L4 (ankle dorsiflexors): 5  L5 (great toe extension): 4  S1 (ankle plantar flexors): 3+    Lumbar (right)   L1 (hip flexors): 5  L2 (hip flexors): 5  L3 (knee extensors): 5  L4 (ankle dorsiflexors): 5  L5 (great toe extension): 5  S1 (ankle plantar flexors): 4+    Dermatome testing   Lumbar (left)   All left lumbar dermatomes intact    Lumbar (right)   All right lumbar dermatomes intact    Palpation   Left   Hypertonic in the lumbar paraspinals.     Right   Hypertonic in the lumbar paraspinals.   Tenderness of the quadratus lumborum and TFL.     Active Range of Motion     Lumbar   Flexion: Lumbar active flexion: 115deg.  Extension: Lumbar active extension: 35deg.    Strength:      Left Hip   Planes of Motion   Abduction: 4-  Adduction: 4    Right Hip   Planes of Motion   Abduction:  4-  Adduction: 4    Left Knee   Flexion: 4+    Right Knee   Flexion: 5    Tests     Left Pelvic Girdle/Sacrum   Negative: sacral rotation.     Right Pelvic Girdle/Sacrum   Negative: sacral rotation.     Left Hip   Negative SI compression and SI distraction.   SLR: Positive.     Right Hip   Negative SI compression and SI distraction.   SLR: Negative.     Additional Tests Details  Traction (-)        Therapeutic Exercises (CPT 34602):     1. MDT edu, x10min    2. HOLLY, x1min every 2 hours      Time-based treatments/modalities:    Physical Therapy Timed Treatment Charges  Therapeutic exercise minutes (CPT 74085): 10 minutes      Assessment, Response and Plan:   Impairments: abnormal or restricted ROM, activity intolerance, impaired physical strength and pain with function    Assessment details:  Patient presents with signs and symptoms consistent with a lumbar derangement and/or neural tension. Patient limitations include weakness, decreased ROM, and pain. Patient will benefit from skilled therapy to improve the aforementioned deficits and decrease further functional decline.   Prognosis: fair    Goals:   Short Term Goals:   1) Patient's symptoms will improve to facilitate sitting >18min.  2) Patient's L gastroc strength will improve by a half muscle grade to facilitate improved gait.   Short term goal time span:  2-4 weeks      Long Term Goals:    1) Patient's symptoms will improve to allow for lifting 25lbs from floor.  2) Patient's LEFS will improve by 10 to demonstrate functional improvement   Long term goal time span:  6-8 weeks    Plan:   Therapy options:  Physical therapy treatment to continue  Planned therapy interventions:  E Stim Unattended (CPT 63571), Manual Therapy (CPT 77314), Neuromuscular Re-education (CPT 38608), Therapeutic Exercise (CPT 43319) and Hot or Cold Pack Therapy (CPT 15169)  Frequency:  2x week  Duration in weeks:  8  Discussed with:  Patient      Functional Assessment Used  PT Functional  Assessment Tool Used: LEFS  PT Functional Assessment Score: 45/80     Referring provider co-signature:  I have reviewed this plan of care and my co-signature certifies the need for services.    Certification Period: 09/27/2023 to  11/22/23    Physician Signature: ________________________________ Date: ______________

## 2023-09-28 ENCOUNTER — OFFICE VISIT (OUTPATIENT)
Dept: MEDICAL GROUP | Facility: PHYSICIAN GROUP | Age: 63
End: 2023-09-28
Payer: MEDICARE

## 2023-09-28 VITALS
TEMPERATURE: 97.9 F | OXYGEN SATURATION: 97 % | SYSTOLIC BLOOD PRESSURE: 116 MMHG | DIASTOLIC BLOOD PRESSURE: 68 MMHG | BODY MASS INDEX: 23.98 KG/M2 | WEIGHT: 130.31 LBS | HEIGHT: 62 IN | HEART RATE: 59 BPM | RESPIRATION RATE: 16 BRPM

## 2023-09-28 DIAGNOSIS — L98.9 SKIN LESION: ICD-10-CM

## 2023-09-28 PROCEDURE — 3078F DIAST BP <80 MM HG: CPT | Performed by: PHYSICIAN ASSISTANT

## 2023-09-28 PROCEDURE — 99214 OFFICE O/P EST MOD 30 MIN: CPT | Performed by: PHYSICIAN ASSISTANT

## 2023-09-28 PROCEDURE — 3074F SYST BP LT 130 MM HG: CPT | Performed by: PHYSICIAN ASSISTANT

## 2023-09-28 PROCEDURE — 1170F FXNL STATUS ASSESSED: CPT | Performed by: PHYSICIAN ASSISTANT

## 2023-09-28 ASSESSMENT — ENCOUNTER SYMPTOMS
ROS SKIN COMMENTS: SKIN LESION
CHILLS: 0
FEVER: 0
SHORTNESS OF BREATH: 0

## 2023-09-28 ASSESSMENT — FIBROSIS 4 INDEX: FIB4 SCORE: 1.24

## 2023-09-28 NOTE — PROGRESS NOTES
"Subjective:     CC: Skin lesion    HPI:   Soledad presents today with the following:    Patient has a skin lesion on her right forearm that she has had for years but now has a new, color change that she noticed over the last few weeks to a month.  She denies any pain or itching.  She has not had any drainage.    ROS:  Review of Systems   Constitutional:  Negative for chills and fever.   Respiratory:  Negative for shortness of breath.    Cardiovascular:  Negative for chest pain.   Skin:         Skin lesion       Objective:     Exam:  /68 (BP Location: Right arm, Patient Position: Sitting, BP Cuff Size: Adult)   Pulse (!) 59   Temp 36.6 °C (97.9 °F) (Temporal)   Resp 16   Ht 1.575 m (5' 2\")   Wt 59.1 kg (130 lb 5 oz)   SpO2 97%   Breastfeeding No   BMI 23.83 kg/m²  Body mass index is 23.83 kg/m².    Physical Exam  Vitals reviewed.   Constitutional:       General: She is not in acute distress.     Appearance: Normal appearance.   Pulmonary:      Effort: Pulmonary effort is normal.   Skin:     Comments: Hyperpigmented nonpalpable lesion noted on the right forearm that has a slightly raised scaling, dry center.   Neurological:      General: No focal deficit present.      Mental Status: She is alert.   Psychiatric:         Mood and Affect: Mood normal.         Behavior: Behavior normal.         Judgment: Judgment normal.           Assessment & Plan:     63 y.o. female with the following -     1. Skin lesion  Chronic, uncontrolled.  Refer to dermatology.    - Referral to Dermatology              Return if symptoms worsen or fail to improve.    Please note that this dictation was created using voice recognition software. I have made every reasonable attempt to correct obvious errors, but I expect that there are errors of grammar and possibly content that I did not discover before finalizing the note.        "

## 2023-10-04 ENCOUNTER — PHYSICAL THERAPY (OUTPATIENT)
Dept: PHYSICAL THERAPY | Facility: REHABILITATION | Age: 63
End: 2023-10-04
Attending: PHYSICIAN ASSISTANT
Payer: MEDICARE

## 2023-10-04 DIAGNOSIS — M79.605 LEFT LEG PAIN: ICD-10-CM

## 2023-10-04 DIAGNOSIS — M53.3 CHRONIC LEFT SI JOINT PAIN: ICD-10-CM

## 2023-10-04 DIAGNOSIS — G89.29 CHRONIC LEFT SI JOINT PAIN: ICD-10-CM

## 2023-10-04 PROCEDURE — 97110 THERAPEUTIC EXERCISES: CPT

## 2023-10-04 NOTE — OP THERAPY DAILY TREATMENT
Outpatient Physical Therapy  DAILY TREATMENT     [facility] [place]  [address], [address]  [facility] [place] [address]  Phone:  [phone]  Fax:  [phone]    Date: 10/04/2023    Patient: Soledad Jordan  YOB: 1960  MRN: [id number]     Time Calculation    Start time: 0720  Stop time: 0800 Time Calculation (min): 40 minutes         Chief Complaint: Back Problem    Visit #: 2    SUBJECTIVE:  Patient reports that she is improving. States overall symptoms were better at night.     OBJECTIVE:  Current objective measures:           Therapeutic Exercises (CPT 70563):     1. Nu step L1, x10min    2. HOLLY, x1min every 2 hours    3. Basic tra edu, x5min    4. Basic tra with LE lifts, x4min    5. Seated hip abd, xfatigue    6. Sit to stand, x10    7. multifidi push, x30      Therapeutic Exercise Summary: Access Code: [email or web address]  URL: [email or web address]  Date: 10/04/2023  Prepared by: Gerson Cantu    Exercises  - Supine Transversus Abdominis Bracing - Hands on Stomach  - 1 x daily - 7 x weekly - 2 sets - 10 reps  - Sit to Stand with Counter Support  - 1 x daily - 7 x weekly - 2 sets - 10 reps  - Seated Hip Abduction with Resistance  - 1 x daily - 7 x weekly - 2 sets - 10 reps  - Seated Multifidi Isometric  - 1 x daily - 7 x weekly - 2 sets - 10 reps  - Prone on Elbows Stretch  - 4 x daily - 7 x weekly - 1 sets - 1 reps - 45sec hold  - Supine Bridge  - 1 x daily - 7 x weekly - 2 sets - 10 reps  - Supine Hamstring Stretch  - 3 x daily - 7 x weekly - 1 sets - 3 reps - 15sec hold  - Modified Martinez Stretch  - 3 x daily - 7 x weekly - 1 sets - 3 reps - 15sec hold  - Tandem Stance in Corner  - 1 x daily - 7 x weekly - 2 sets - 10 reps      Time-based treatments/modalities:    Physical Therapy Timed Treatment Charges  Therapeutic exercise minutes (CPT 43698): 40 minutes      Pain rating (1-10) before treatment:  2  Pain rating (1-10) after treatment:  0    ASSESSMENT:   Response to  treatment: Patient is responding well to prone on elbows. Notes that overall she is doing well.     PLAN/RECOMMENDATIONS:   Plan for treatment: therapy treatment to continue next visit.  Planned interventions for next visit: continue with current treatment.

## 2023-10-11 ENCOUNTER — PHYSICAL THERAPY (OUTPATIENT)
Dept: PHYSICAL THERAPY | Facility: REHABILITATION | Age: 63
End: 2023-10-11
Attending: PHYSICIAN ASSISTANT
Payer: MEDICARE

## 2023-10-11 ENCOUNTER — OFFICE VISIT (OUTPATIENT)
Dept: MEDICAL GROUP | Facility: PHYSICIAN GROUP | Age: 63
End: 2023-10-11
Payer: MEDICARE

## 2023-10-11 VITALS
HEIGHT: 62 IN | BODY MASS INDEX: 24.21 KG/M2 | OXYGEN SATURATION: 95 % | WEIGHT: 131.56 LBS | TEMPERATURE: 98.3 F | SYSTOLIC BLOOD PRESSURE: 112 MMHG | DIASTOLIC BLOOD PRESSURE: 66 MMHG | RESPIRATION RATE: 12 BRPM | HEART RATE: 79 BPM

## 2023-10-11 DIAGNOSIS — M79.605 LEFT LEG PAIN: ICD-10-CM

## 2023-10-11 DIAGNOSIS — Z00.00 ENCOUNTER FOR MEDICARE ANNUAL WELLNESS EXAM: ICD-10-CM

## 2023-10-11 DIAGNOSIS — M53.3 CHRONIC LEFT SI JOINT PAIN: ICD-10-CM

## 2023-10-11 DIAGNOSIS — Z12.31 ENCOUNTER FOR SCREENING MAMMOGRAM FOR MALIGNANT NEOPLASM OF BREAST: ICD-10-CM

## 2023-10-11 DIAGNOSIS — Z23 NEED FOR VACCINATION: ICD-10-CM

## 2023-10-11 DIAGNOSIS — G89.29 CHRONIC LEFT SI JOINT PAIN: ICD-10-CM

## 2023-10-11 PROCEDURE — 3078F DIAST BP <80 MM HG: CPT | Performed by: PHYSICIAN ASSISTANT

## 2023-10-11 PROCEDURE — 90662 IIV NO PRSV INCREASED AG IM: CPT | Performed by: PHYSICIAN ASSISTANT

## 2023-10-11 PROCEDURE — 3074F SYST BP LT 130 MM HG: CPT | Performed by: PHYSICIAN ASSISTANT

## 2023-10-11 PROCEDURE — 1170F FXNL STATUS ASSESSED: CPT | Performed by: PHYSICIAN ASSISTANT

## 2023-10-11 PROCEDURE — G0438 PPPS, INITIAL VISIT: HCPCS | Mod: 25 | Performed by: PHYSICIAN ASSISTANT

## 2023-10-11 PROCEDURE — G0008 ADMIN INFLUENZA VIRUS VAC: HCPCS | Performed by: PHYSICIAN ASSISTANT

## 2023-10-11 PROCEDURE — 97110 THERAPEUTIC EXERCISES: CPT

## 2023-10-11 ASSESSMENT — ACTIVITIES OF DAILY LIVING (ADL): BATHING_REQUIRES_ASSISTANCE: 0

## 2023-10-11 ASSESSMENT — FIBROSIS 4 INDEX: FIB4 SCORE: 1.24

## 2023-10-11 ASSESSMENT — ENCOUNTER SYMPTOMS: GENERAL WELL-BEING: FAIR

## 2023-10-11 ASSESSMENT — PATIENT HEALTH QUESTIONNAIRE - PHQ9: CLINICAL INTERPRETATION OF PHQ2 SCORE: 0

## 2023-10-11 NOTE — PROGRESS NOTES
Chief Complaint   Patient presents with    Annual Exam       HPI:  Soledad Jordan is a 63 y.o. here for Medicare Annual Wellness Visit     Patient Active Problem List    Diagnosis Date Noted    Left leg pain 09/01/2023    Vaginal irritation 09/01/2023    Dyslipidemia 03/01/2023    Leukopenia 03/01/2023    Age-related osteoporosis without current pathological fracture 01/30/2023    Postmenopausal 01/26/2023    Fall 01/26/2023    Vaginal dryness 01/26/2023    GERD (gastroesophageal reflux disease) 11/14/2022    IGT (impaired glucose tolerance) 11/14/2022    BMI 23.0-23.9, adult 11/14/2022    Primary osteoarthritis of both hands 11/14/2022       Current Outpatient Medications   Medication Sig Dispense Refill    fluconazole (DIFLUCAN) 150 MG tablet Use 1 pill (one dose) as needed for symptoms of yeast infection. May repeat after 3 days if needed. 5 Tablet 0    triamcinolone acetonide (KENALOG) 0.025 % Cream Apply  topically 1 time a day as needed.      alendronate (FOSAMAX) 70 MG Tab Take 1 Tablet by mouth every 7 days. 12 Tablet 3    omeprazole (PRILOSEC) 20 MG delayed-release capsule TAKE 1 CAPSULE BY MOUTH ONCE DAILY 30 MINUTES BEFORE MORNING MEAL 90 Capsule 3     No current facility-administered medications for this visit.          Current supplements as per medication list.     Allergies: Phenergan [promethazine hcl], Oxycodone-acetaminophen, and Percocet [oxycodone-acetaminophen]    Current social contact/activities: enjoys times by herself; does things with her  and son; talks on the phone with her sister and friends. Watches her granddaughters.    She  reports that she has never smoked. She has never used smokeless tobacco. She reports that she does not drink alcohol and does not use drugs.  Counseling given: Not Answered      ROS:    Gait: Uses no assistive device  Ostomy: No  Other tubes: No  Amputations: No  Chronic oxygen use: No  Last eye exam: May 2023  Wears hearing aids: No   : Denies any  urinary leakage during the last 6 months    Screening:    Depression Screening  Little interest or pleasure in doing things?  0 - not at all  Feeling down, depressed , or hopeless? 0 - not at all  Trouble falling or staying asleep, or sleeping too much?     Feeling tired or having little energy?     Poor appetite or overeating?     Feeling bad about yourself - or that you are a failure or have let yourself or your family down?    Trouble concentrating on things, such as reading the newspaper or watching television?    Moving or speaking so slowly that other people could have noticed.  Or the opposite - being so fidgety or restless that you have been moving around a lot more than usual?     Thoughts that you would be better off dead, or of hurting yourself?     Patient Health Questionnaire Score:      If depressive symptoms identified deferred to follow up visit unless specifically addressed in assessment and plan.    Interpretation of PHQ-9 Total Score   Score Severity   1-4 No Depression   5-9 Mild Depression   10-14 Moderate Depression   15-19 Moderately Severe Depression   20-27 Severe Depression    Screening for Cognitive Impairment  Do you or any of your friends or family members have any concern about your memory? No  Three Minute Recall (Banana, Sunrise, Chair) 3/3    Fernando clock face with all 12 numbers and set the hands to show 20 past 8.  Yes    Cognitive concerns identified deferred for follow up unless specifically addressed in assessment and plan.    Fall Risk Assessment  Has the patient had two or more falls in the last year or any fall with injury in the last year?  No    Safety Assessment  Do you always wear your seatbelt?  Yes  Any changes to home needed to function safely? No  Difficulty hearing.  No  Patient counseled about all safety risks that were identified.    Functional Assessment ADLs  Are there any barriers preventing you from cooking for yourself or meeting nutritional needs?  No.    Are  there any barriers preventing you from driving safely or obtaining transportation?  No.    Are there any barriers preventing you from using a telephone or calling for help?  No    Are there any barriers preventing you from shopping?  No.    Are there any barriers preventing you from taking care of your own finances?  No    Are there any barriers preventing you from managing your medications?  No    Are there any barriers preventing you from showering, bathing or dressing yourself? No    Are there any barriers preventing you from doing housework or laundry? No  Are there any barriers preventing you from using the toilet?No  Are you currently engaging in any exercise or physical activity?  Yes. Walk, play with grandkids, going to PT    Self-Assessment of Health  What is your perception of your health? Fair  Do you sleep more than six hours a night? Yes  In the past 7 days, how much did pain keep you from doing your normal work? Some  Do you spend quality time with family or friends (virtually or in person)? Yes  Do you usually eat a heart healthy diet that constists of a variety of fruits, vegetables, whole grains and fiber? Yes  Do you eat foods high in fat and/or Fast Food more than three times per week? No    Advance Care Planning  Do you have an Advance Directive, Living Will, Durable Power of , or POLST? Yes    Living Will     is not on file - instructed patient to bring in a copy to scan into their chart      Health Maintenance Summary            Postponed - Colorectal Cancer Screening (Colon Cancer Screening Annual FIT - Yearly) Postponed until 1/26/2024 02/18/2014  POCT STOOL              Postponed - Hepatitis C Screening (Once) Postponed until 1/26/2024      No completion history exists for this topic.              Ordered - Mammogram (Every 2 Years) Ordered on 10/11/2023      07/06/2022  MA-DIAGNOSTIC MAMMO BILAT W/TOMOSYNTHESIS W/CAD    06/25/2021  MA-DIAGNOSTIC MAMMO LEFT W/TOMOSYNTHESIS W/O  CAD    12/09/2020  MA-SCREENING MAMMO BILAT W/TOMOSYNTHESIS W/O CAD    10/02/2017  MA-SCREEN MAMMO W/CAD-BILAT    09/28/2016  MA-SCREEN MAMMO W/CAD-BILAT              Annual Wellness Visit (Every 366 Days) Next due on 10/11/2024      10/11/2023  Visit Dx: Encounter for Medicare annual wellness exam    09/21/2023  Level of Service: OR ANNUAL WELLNESS VISIT-INCLUDES PPPS SUBSEQUE*              Bone Density Scan (Every 5 Years) Next due on 1/27/2028 01/27/2023  DS-BONE DENSITY STUDY (DEXA)              IMM DTaP/Tdap/Td Vaccine (2 - Td or Tdap) Next due on 1/26/2033 01/26/2023  Imm Admin: Tdap Vaccine              Zoster (Shingles) Vaccines (Series Information) Completed      09/17/2021  Imm Admin: Zoster Vaccine Recombinant (RZV) (SHINGRIX)    06/11/2021  Imm Admin: Zoster Vaccine Recombinant (RZV) (SHINGRIX)              Pneumococcal Vaccine: 65+ Years (Series Information) Completed      03/18/2023  Imm Admin: Pneumococcal Conjugate Vaccine (PCV20)              Influenza Vaccine (Series Information) Completed      10/11/2023  Imm Admin: Influenza Vaccine Adult HD    11/01/2022  Imm Admin: Influenza Vaccine Quad Inj (Pf)    10/02/2020  Imm Admin: Influenza Vaccine Quad Recombinant    10/21/2019  Imm Admin: Influenza Vaccine Quad Recombinant    10/19/2018  Imm Admin: Influenza Vaccine Quad Inj (Pf)    Only the first 5 history entries have been loaded, but more history exists.              Hepatitis A Vaccine (Hep A) (Series Information) Aged Out      No completion history exists for this topic.              Hepatitis B Vaccine (Hep B) (Series Information) Aged Out      No completion history exists for this topic.              HPV Vaccines (Series Information) Aged Out      No completion history exists for this topic.              Polio Vaccine (Inactivated Polio) (Series Information) Aged Out      No completion history exists for this topic.              Meningococcal Immunization (Series Information) Aged Out  "     No completion history exists for this topic.              Discontinued - COVID-19 Vaccine  Discontinued      05/05/2022  Imm Admin: MODERNA SARS-COV-2 VACCINE (12+)    12/02/2021  Imm Admin: MODERNA SARS-COV-2 VACCINE (12+)    04/29/2021  Imm Admin: MODERNA SARS-COV-2 VACCINE (12+)    04/01/2021  Imm Admin: MODERNA SARS-COV-2 VACCINE (12+)                    Patient Care Team:  Sonia Ramirez P.A.-C. as PCP - General (Physician Assistant)  Sonia Ramirez P.A.-C. as PCP - H Paneled (Physician Assistant)  Gerson Cantu, PT, DPT as Physical Therapist (Physical Therapy)      Social History     Tobacco Use    Smoking status: Never    Smokeless tobacco: Never   Vaping Use    Vaping Use: Never used   Substance Use Topics    Alcohol use: No    Drug use: No     Family History   Problem Relation Age of Onset    Cancer Neg Hx     Ovarian Cancer Neg Hx     Tubal Cancer Neg Hx     Peritoneal Cancer Neg Hx     Colorectal Cancer Neg Hx     Breast Cancer Neg Hx     Bilateral Breast Cancer Neg Hx     Diabetes Neg Hx     Heart Disease Neg Hx     Hypertension Neg Hx      She  has a past medical history of Dyslipidemia, GERD (gastroesophageal reflux disease), H/O mammogram (2013), Pap smear for cervical cancer screening (2012), and PUD (peptic ulcer disease).   Past Surgical History:   Procedure Laterality Date    OTHER ORTHOPEDIC SURGERY  01/01/2009    left shoulder    OTHER ORTHOPEDIC SURGERY  01/01/2005    left knee    ABDOMINAL HYSTERECTOMY TOTAL      ovaries/uterus    APPENDECTOMY      CHOLECYSTECTOMY      OTHER ORTHOPEDIC SURGERY Left     bunion left foot       Exam:   /66 (BP Location: Right arm, Patient Position: Sitting, BP Cuff Size: Adult)   Pulse 79   Temp 36.8 °C (98.3 °F) (Temporal)   Resp 12   Ht 1.575 m (5' 2\")   Wt 59.7 kg (131 lb 9 oz)   SpO2 95%  Body mass index is 24.06 kg/m².    Hearing good.    Dentition multiple carries  Alert, oriented in no acute distress.  Eye contact is good, " speech goal directed, affect calm    Assessment and Plan. The following treatment and monitoring plan is recommended:    1. Encounter for Medicare annual wellness exam    2. Encounter for screening mammogram for malignant neoplasm of breast  - MA-SCREENING MAMMO BILAT W/TOMOSYNTHESIS W/CAD; Future    3. Need for vaccination  - INFLUENZA VACCINE, HIGH DOSE (65+ ONLY)      Services suggested: No services needed at this time  Health Care Screening: Age-appropriate preventive services recommended by USPTF and ACIP covered by Medicare were discussed today. Services ordered if indicated and agreed upon by the patient.  Referrals offered: Community-based lifestyle interventions to reduce health risks and promote self-management and wellness, fall prevention, nutrition, physical activity, tobacco-use cessation, weight loss, and mental health services as per orders if indicated.    Discussion today about general wellness and lifestyle habits:    Prevent falls and reduce trip hazards; Cautioned about securing or removing rugs.  Have a working fire alarm and carbon monoxide detector;   Engage in regular physical activity and social activities     Follow-up: Return in about 1 year (around 10/11/2024).

## 2023-10-11 NOTE — OP THERAPY DAILY TREATMENT
Outpatient Physical Therapy  DAILY TREATMENT     [facility] [place]  [address], [address]  [place] [facility] [address]  Phone:  [phone]  Fax:  [phone]    Date: 10/11/2023    Patient: Soledad Jordan  YOB: 1960  MRN: [id number]     Time Calculation    Start time: 0725  Stop time: 0805 Time Calculation (min): 40 minutes         Chief Complaint: Back Problem    Visit #: 3    SUBJECTIVE:  Patient reports that she is feeling better. Notes overall decreased pain and improved function at home. She is no longer carrying her granddaughter.    OBJECTIVE:  Current objective measures:           Therapeutic Exercises (CPT 68486):     1. Nu step L1, x10min    2. HOLLY, x45sec every 2 hours    3. clams, xfatigue    4. Basic tra with LE lifts, x4min    5. Seated hip abd, xfatigue    6. Sit to stand, x10    7. multifidi push, x30      Therapeutic Exercise Summary: Access Code: [email or web address]  URL: [email or web address]  Date: 10/04/2023  Prepared by: Gerson Cantu    Exercises  - Supine Transversus Abdominis Bracing - Hands on Stomach  - 1 x daily - 7 x weekly - 2 sets - 10 reps  - Sit to Stand with Counter Support  - 1 x daily - 7 x weekly - 2 sets - 10 reps  - Seated Hip Abduction with Resistance  - 1 x daily - 7 x weekly - 2 sets - 10 reps  - Seated Multifidi Isometric  - 1 x daily - 7 x weekly - 2 sets - 10 reps  - Prone on Elbows Stretch  - 4 x daily - 7 x weekly - 1 sets - 1 reps - 45sec hold  - Supine Bridge  - 1 x daily - 7 x weekly - 2 sets - 10 reps  - Supine Hamstring Stretch  - 3 x daily - 7 x weekly - 1 sets - 3 reps - 15sec hold  - Modified Martinez Stretch  - 3 x daily - 7 x weekly - 1 sets - 3 reps - 15sec hold  - Tandem Stance in Corner  - 1 x daily - 7 x weekly - 2 sets - 10 reps      Time-based treatments/modalities:    Physical Therapy Timed Treatment Charges  Therapeutic exercise minutes (CPT 76866): 40 minutes      Pain rating (1-10) before treatment:  1  Pain rating  (1-10) after treatment:  1    ASSESSMENT:   Response to treatment: Patient responded well to therapy with an overall decrease in symptoms. Patient to continue with current ex program.     PLAN/RECOMMENDATIONS:   Plan for treatment: therapy treatment to continue next visit.  Planned interventions for next visit: continue with current treatment.

## 2023-10-18 ENCOUNTER — PHYSICAL THERAPY (OUTPATIENT)
Dept: PHYSICAL THERAPY | Facility: REHABILITATION | Age: 63
End: 2023-10-18
Attending: PHYSICIAN ASSISTANT
Payer: MEDICARE

## 2023-10-18 DIAGNOSIS — M79.605 LEFT LEG PAIN: ICD-10-CM

## 2023-10-18 DIAGNOSIS — G89.29 CHRONIC LEFT SI JOINT PAIN: ICD-10-CM

## 2023-10-18 DIAGNOSIS — M53.3 CHRONIC LEFT SI JOINT PAIN: ICD-10-CM

## 2023-10-18 PROCEDURE — 97110 THERAPEUTIC EXERCISES: CPT

## 2023-10-18 NOTE — OP THERAPY DAILY TREATMENT
Outpatient Physical Therapy  DAILY TREATMENT     [facility] [place]  [address]  [place] [address]  Phone:  [phone]  Fax:  [phone]    Date: 10/18/2023    Patient: Soledad Jordan  YOB: 1960  MRN: [id number]     Time Calculation    Start time: 0724  Stop time: 0805 Time Calculation (min): 41 minutes         Chief Complaint: Back Problem    Visit #: 4    SUBJECTIVE:  Patient reports an increase in LE pain. States that symptoms have worsened.     OBJECTIVE:  Current objective measures:           Therapeutic Exercises (CPT 87259):     1. Nu step L1, x10min    2. press ups, x15  (HEP x10 every 3-4 hours)    3. clams, xfatigue    4. Basic tra with LE lifts, x4min    5. Seated hip abd, xfatigue    6. Sit to stand, x10    7. multifidi push, x30      Therapeutic Exercise Summary: Access Code: [email or web address]  URL: [email or web address]  Date: 10/04/2023  Prepared by: Gerson Cantu    Exercises  - Supine Transversus Abdominis Bracing - Hands on Stomach  - 1 x daily - 7 x weekly - 2 sets - 10 reps  - Sit to Stand with Counter Support  - 1 x daily - 7 x weekly - 2 sets - 10 reps  - Seated Hip Abduction with Resistance  - 1 x daily - 7 x weekly - 2 sets - 10 reps  - Seated Multifidi Isometric  - 1 x daily - 7 x weekly - 2 sets - 10 reps  - Prone on Elbows Stretch  - 4 x daily - 7 x weekly - 1 sets - 1 reps - 45sec hold  - Supine Bridge  - 1 x daily - 7 x weekly - 2 sets - 10 reps  - Supine Hamstring Stretch  - 3 x daily - 7 x weekly - 1 sets - 3 reps - 15sec hold  - Modified Martinez Stretch  - 3 x daily - 7 x weekly - 1 sets - 3 reps - 15sec hold  - Tandem Stance in Corner  - 1 x daily - 7 x weekly - 2 sets - 10 reps    Time-based treatments/modalities:    Physical Therapy Timed Treatment Charges  Therapeutic exercise minutes (CPT 14569): 40 minutes      Pain rating (1-10) before treatment:  3  Pain rating (1-10) after treatment:  1      ASSESSMENT:   Response to  treatment: Patient respond fair to therapy with a decrease in pain. One of the main drivers of symptoms continues to be neural tension. Plan to continue with current POC. Patient may benefit from PMR if symptoms do not respond.     PLAN/RECOMMENDATIONS:   Plan for treatment: therapy treatment to continue next visit.  Planned interventions for next visit: continue with current treatment.

## 2023-10-25 ENCOUNTER — APPOINTMENT (OUTPATIENT)
Dept: PHYSICAL THERAPY | Facility: REHABILITATION | Age: 63
End: 2023-10-25
Attending: PHYSICIAN ASSISTANT
Payer: MEDICARE

## 2023-10-27 ENCOUNTER — PHYSICAL THERAPY (OUTPATIENT)
Dept: PHYSICAL THERAPY | Facility: REHABILITATION | Age: 63
End: 2023-10-27
Attending: PHYSICIAN ASSISTANT
Payer: MEDICARE

## 2023-10-27 DIAGNOSIS — G89.29 CHRONIC LEFT SI JOINT PAIN: ICD-10-CM

## 2023-10-27 DIAGNOSIS — M79.605 LEFT LEG PAIN: ICD-10-CM

## 2023-10-27 DIAGNOSIS — M53.3 CHRONIC LEFT SI JOINT PAIN: ICD-10-CM

## 2023-10-27 PROCEDURE — 97014 ELECTRIC STIMULATION THERAPY: CPT

## 2023-10-27 PROCEDURE — 97110 THERAPEUTIC EXERCISES: CPT

## 2023-10-27 NOTE — OP THERAPY DAILY TREATMENT
Outpatient Physical Therapy  DAILY TREATMENT     [facility] [address], [address]  [facility] [address]  Phone:  [phone]  Fax:  [phone]    Date: 10/27/2023    Patient: Soledad Jordan  YOB: 1960  MRN: [id number]     Time Calculation    Start time: 1415  Stop time: 1500 Time Calculation (min): 45 minutes         Chief Complaint: Back Problem    Visit #: 5    SUBJECTIVE:  Patient reports that she is doing better but symptoms are still present.     OBJECTIVE:  Current objective measures:   Prone press ups continue to centralize symptoms          Therapeutic Exercises (CPT 86529):     1. Nu step L1, x10min    2. press ups, x15  (HEP x10 every 3-4 hours)    3. clams, xfatigue    4. Basic tra with LE lifts, x4min    5. Seated hip abd, xfatigue    6. Sit to stand, x10    7. supine 90/90, x30      Therapeutic Exercise Summary: Access Code: [email or web address]  URL: [email or web address]  Date: 10/04/2023  Prepared by: Gerson Cantu    Exercises  - Supine Transversus Abdominis Bracing - Hands on Stomach  - 1 x daily - 7 x weekly - 2 sets - 10 reps  - Sit to Stand with Counter Support  - 1 x daily - 7 x weekly - 2 sets - 10 reps  - Seated Hip Abduction with Resistance  - 1 x daily - 7 x weekly - 2 sets - 10 reps  - Seated Multifidi Isometric  - 1 x daily - 7 x weekly - 2 sets - 10 reps  - Prone on Elbows Stretch  - 4 x daily - 7 x weekly - 1 sets - 1 reps - 45sec hold  - Supine Bridge  - 1 x daily - 7 x weekly - 2 sets - 10 reps  - Supine Hamstring Stretch  - 3 x daily - 7 x weekly - 1 sets - 3 reps - 15sec hold  - Modified Martinez Stretch  - 3 x daily - 7 x weekly - 1 sets - 3 reps - 15sec hold  - Tandem Stance in Corner  - 1 x daily - 7 x weekly - 2 sets - 10 reps    Therapeutic Treatments and Modalities:     1. E Stim Unattended (CPT 53708), IFC with HP x15min 80-150hz, Lumbar    Time-based treatments/modalities:    Physical Therapy Timed Treatment Charges  Therapeutic  exercise minutes (CPT 93834): 30 minutes      Pain rating (1-10) before treatment:  4  Pain rating (1-10) after treatment:  1    ASSESSMENT:   Response to treatment: Patient responded well to therapy with a decrease in symptoms. Patient is not maintaining a reduced state. Plan to continue with current POC. Plan to follow up with MD re: PMR.     PLAN/RECOMMENDATIONS:   Plan for treatment: therapy treatment to continue next visit.  Planned interventions for next visit: continue with current treatment.

## 2023-10-29 ENCOUNTER — OFFICE VISIT (OUTPATIENT)
Dept: URGENT CARE | Facility: PHYSICIAN GROUP | Age: 63
End: 2023-10-29
Payer: MEDICARE

## 2023-10-29 VITALS
HEART RATE: 62 BPM | WEIGHT: 128 LBS | BODY MASS INDEX: 23.55 KG/M2 | DIASTOLIC BLOOD PRESSURE: 60 MMHG | SYSTOLIC BLOOD PRESSURE: 106 MMHG | RESPIRATION RATE: 16 BRPM | TEMPERATURE: 96.7 F | OXYGEN SATURATION: 97 % | HEIGHT: 62 IN

## 2023-10-29 DIAGNOSIS — R11.2 NAUSEA AND VOMITING, UNSPECIFIED VOMITING TYPE: ICD-10-CM

## 2023-10-29 DIAGNOSIS — R05.2 SUBACUTE COUGH: ICD-10-CM

## 2023-10-29 DIAGNOSIS — R68.89 FLU-LIKE SYMPTOMS: ICD-10-CM

## 2023-10-29 LAB
FLUAV RNA SPEC QL NAA+PROBE: NEGATIVE
FLUBV RNA SPEC QL NAA+PROBE: NEGATIVE
RSV RNA SPEC QL NAA+PROBE: NEGATIVE
SARS-COV-2 RNA RESP QL NAA+PROBE: NEGATIVE

## 2023-10-29 PROCEDURE — 99213 OFFICE O/P EST LOW 20 MIN: CPT | Performed by: NURSE PRACTITIONER

## 2023-10-29 PROCEDURE — 0241U POCT CEPHEID COV-2, FLU A/B, RSV - PCR: CPT | Performed by: NURSE PRACTITIONER

## 2023-10-29 PROCEDURE — 3074F SYST BP LT 130 MM HG: CPT | Performed by: NURSE PRACTITIONER

## 2023-10-29 PROCEDURE — 1170F FXNL STATUS ASSESSED: CPT | Performed by: NURSE PRACTITIONER

## 2023-10-29 PROCEDURE — 3078F DIAST BP <80 MM HG: CPT | Performed by: NURSE PRACTITIONER

## 2023-10-29 RX ORDER — ONDANSETRON 4 MG/1
4 TABLET, ORALLY DISINTEGRATING ORAL EVERY 6 HOURS PRN
Qty: 15 TABLET | Refills: 0 | Status: SHIPPED | OUTPATIENT
Start: 2023-10-29

## 2023-10-29 RX ORDER — BENZONATATE 100 MG/1
100 CAPSULE ORAL 3 TIMES DAILY PRN
Qty: 60 CAPSULE | Refills: 0 | Status: SHIPPED | OUTPATIENT
Start: 2023-10-29

## 2023-10-29 ASSESSMENT — ENCOUNTER SYMPTOMS
VOMITING: 1
COUGH: 1
HEADACHES: 1
CARDIOVASCULAR NEGATIVE: 1
SPUTUM PRODUCTION: 0
NAUSEA: 1
FEVER: 0
MUSCULOSKELETAL NEGATIVE: 1
SHORTNESS OF BREATH: 0
SORE THROAT: 1
CHILLS: 0
EYES NEGATIVE: 1

## 2023-10-29 ASSESSMENT — FIBROSIS 4 INDEX: FIB4 SCORE: 1.24

## 2023-10-29 NOTE — PROGRESS NOTES
"Subjective:   Soledad Jordan is a 63 y.o. female who presents for Pharyngitis (Sore throat, hard to swallow headache X 4 days./Throwing up X 1 day. Pt states no longer experiencing sore throat)      Pharyngitis   This is a new problem. Episode onset: 4 days -  and grandchildren sick with same. The problem has been resolved. There has been no fever. Associated symptoms include congestion, coughing, headaches and vomiting. Pertinent negatives include no shortness of breath. She has tried NSAIDs, gargles and acetaminophen for the symptoms. The treatment provided mild relief.       Review of Systems   Constitutional:  Positive for malaise/fatigue. Negative for chills and fever.   HENT:  Positive for congestion and sore throat.         Sore throat has almost resolved now    Eyes: Negative.    Respiratory:  Positive for cough. Negative for sputum production and shortness of breath.    Cardiovascular: Negative.    Gastrointestinal:  Positive for nausea and vomiting.        One episode of vomiting yesterday - none since   Genitourinary: Negative.    Musculoskeletal: Negative.    Skin: Negative.    Neurological:  Positive for headaches.       Medications, Allergies, and current problem list reviewed today in Epic.     Objective:     /60 (BP Location: Left arm, Patient Position: Sitting, BP Cuff Size: Adult)   Pulse 62   Temp 35.9 °C (96.7 °F) (Temporal)   Resp 16   Ht 1.575 m (5' 2\")   Wt 58.1 kg (128 lb)   SpO2 97%     Physical Exam  Vitals reviewed.   Constitutional:       General: She is not in acute distress.     Appearance: Normal appearance. She is not ill-appearing.   HENT:      Head: Normocephalic and atraumatic.      Right Ear: Tympanic membrane, ear canal and external ear normal.      Left Ear: Tympanic membrane, ear canal and external ear normal.      Nose: Congestion and rhinorrhea present.      Mouth/Throat:      Mouth: Mucous membranes are moist.      Pharynx: Oropharynx is clear. Posterior " oropharyngeal erythema present.   Eyes:      Extraocular Movements: Extraocular movements intact.      Conjunctiva/sclera: Conjunctivae normal.      Pupils: Pupils are equal, round, and reactive to light.   Cardiovascular:      Rate and Rhythm: Normal rate and regular rhythm.      Pulses: Normal pulses.      Heart sounds: Normal heart sounds.   Pulmonary:      Effort: Pulmonary effort is normal. No respiratory distress.      Breath sounds: Normal breath sounds. No wheezing, rhonchi or rales.   Abdominal:      General: Abdomen is flat.      Palpations: Abdomen is soft.   Musculoskeletal:         General: Normal range of motion.      Cervical back: Normal range of motion and neck supple. No tenderness.   Lymphadenopathy:      Cervical: No cervical adenopathy.   Skin:     General: Skin is warm and dry.      Capillary Refill: Capillary refill takes less than 2 seconds.   Neurological:      General: No focal deficit present.      Mental Status: She is alert.   Psychiatric:         Mood and Affect: Mood normal.         Behavior: Behavior normal.       Results for orders placed or performed in visit on 10/29/23   POCT CoV-2, Flu A/B, RSV by PCR   Result Value Ref Range    SARS-CoV-2 by PCR Negative Negative, Invalid    Influenza virus A RNA Negative Negative, Invalid    Influenza virus B, PCR Negative Negative, Invalid    RSV, PCR Negative Negative, Invalid       Assessment/Plan:     Diagnosis and associated orders:     1. Flu-like symptoms        2. Subacute cough  POCT CoV-2, Flu A/B, RSV by PCR    benzonatate (TESSALON) 100 MG Cap      3. Nausea and vomiting, unspecified vomiting type  ondansetron (ZOFRAN ODT) 4 MG TABLET DISPERSIBLE         Comments/MDM:     Advised patient symptoms are viral in etiology, recommend supportive care. Increased fluids and rest.  Recommend over-the-counter cold and flu medications and Tylenol and/or ibuprofen for symptomatic relief and fevers.  Discussed use of nedi-pot, humidifier, and  Flonase nasal spray as well.  Discussed good hand hygiene and ways to decrease spread of disease.  Follow-up with PCP return for reevaluation if symptoms persist/worsen.  Patient offered discharge instructions regarding viral illness.  The patient demonstrated a good understanding and agreed with the treatment plan.            Differential diagnosis, natural history, supportive care, and indications for immediate follow-up discussed.    Advised the patient to follow-up with the primary care physician for recheck, reevaluation, and consideration of further management.    Please note that this dictation was created using voice recognition software. I have made a reasonable attempt to correct obvious errors, but I expect that there are errors of grammar and possibly content that I did not discover before finalizing the note.    This note was electronically signed by MARYANA Roper

## 2023-10-30 ENCOUNTER — TELEPHONE (OUTPATIENT)
Dept: MEDICAL GROUP | Facility: PHYSICIAN GROUP | Age: 63
End: 2023-10-30
Payer: MEDICARE

## 2023-10-30 DIAGNOSIS — M79.605 LEFT LEG PAIN: ICD-10-CM

## 2023-10-30 NOTE — TELEPHONE ENCOUNTER
Please let patient know I referred her to physiatry at the request of her physical therapist.    Jacqueline

## 2023-11-01 ENCOUNTER — PHYSICAL THERAPY (OUTPATIENT)
Dept: PHYSICAL THERAPY | Facility: REHABILITATION | Age: 63
End: 2023-11-01
Attending: PHYSICIAN ASSISTANT
Payer: MEDICARE

## 2023-11-01 DIAGNOSIS — M53.3 CHRONIC LEFT SI JOINT PAIN: ICD-10-CM

## 2023-11-01 DIAGNOSIS — M79.605 LEFT LEG PAIN: ICD-10-CM

## 2023-11-01 DIAGNOSIS — G89.29 CHRONIC LEFT SI JOINT PAIN: ICD-10-CM

## 2023-11-01 PROCEDURE — 97110 THERAPEUTIC EXERCISES: CPT

## 2023-11-01 PROCEDURE — 97014 ELECTRIC STIMULATION THERAPY: CPT

## 2023-11-01 NOTE — OP THERAPY DAILY TREATMENT
Outpatient Physical Therapy  DAILY TREATMENT     [facility] [place]  [address], [address]  [place] [address]  Phone:  [phone]  Fax:  [phone]    Date: 11/01/2023    Patient: Soledad Jordan  YOB: 1960  MRN: [id number]     Time Calculation    Start time: 0725  Stop time: 0810 Time Calculation (min): 45 minutes         Chief Complaint: Back Problem    Visit #: 6    SUBJECTIVE:  Patient reports no change in leg pain. States once it returned symptoms have remained constant.     OBJECTIVE:  Current objective measures:           Therapeutic Exercises (CPT 27389):     1. Nu step L1, x10min    2. press ups, x15  (HEP x10 every 3-4 hours), HOLD    3. clams, xfatigue, HOLD    4. Basic tra with LE lifts, x4min    5. Seated hip abd, xfatigue    6. Sit to stand, x10, HOLD    7. supine 90/90, x30    8. piriformis stretch, x30    9. HS stretch, [id number]    10. DKTC stretch, x5 hold 5sec      Therapeutic Exercise Summary: Access Code: [email or web address]  URL: [email or web address]  Date: 10/04/2023  Prepared by: Gerson Cantu    Exercises  - Supine Transversus Abdominis Bracing - Hands on Stomach  - 1 x daily - 7 x weekly - 2 sets - 10 reps  - Sit to Stand with Counter Support  - 1 x daily - 7 x weekly - 2 sets - 10 reps HOLD  - Seated Hip Abduction with Resistance  - 1 x daily - 7 x weekly - 2 sets - 10 reps  - Seated Multifidi Isometric  - 1 x daily - 7 x weekly - 2 sets - 10 reps  - Prone on Elbows Stretch  - 4 x daily - 7 x weekly - 1 sets - 1 reps - 45sec hold HOLD  - Supine Bridge  - 1 x daily - 7 x weekly - 2 sets - 10 reps  - Supine Hamstring Stretch  - 3 x daily - 7 x weekly - 1 sets - 3 reps - 15sec hold  - Modified Martinez Stretch  - 3 x daily - 7 x weekly - 1 sets - 3 reps - 15sec hold  - Tandem Stance in Corner  - 1 x daily - 7 x weekly - 2 sets - 10 reps    Therapeutic Treatments and Modalities:     1. E Stim Unattended (CPT 81261), IFC with HP x15min 80-150hz,  Lumbar    Time-based treatments/modalities:    Physical Therapy Timed Treatment Charges  Therapeutic exercise minutes (CPT 50452): 30 minutes      Pain rating (1-10) before treatment:  3  Pain rating (1-10) after treatment:  1    ASSESSMENT:   Response to treatment: Patient responded fair to therapy with an overall decrease in symptoms post stretching and IFC. Patient to follow up with PMR.     PLAN/RECOMMENDATIONS:   Plan for treatment: therapy treatment to continue next visit.  Planned interventions for next visit: continue with current treatment.

## 2023-11-08 ENCOUNTER — OFFICE VISIT (OUTPATIENT)
Dept: PHYSICAL MEDICINE AND REHAB | Facility: MEDICAL CENTER | Age: 63
End: 2023-11-08
Payer: MEDICARE

## 2023-11-08 VITALS
SYSTOLIC BLOOD PRESSURE: 130 MMHG | DIASTOLIC BLOOD PRESSURE: 84 MMHG | OXYGEN SATURATION: 96 % | BODY MASS INDEX: 23.77 KG/M2 | HEIGHT: 62 IN | WEIGHT: 129.2 LBS | HEART RATE: 82 BPM | TEMPERATURE: 97.6 F

## 2023-11-08 DIAGNOSIS — M54.42 CHRONIC LEFT-SIDED LOW BACK PAIN WITH LEFT-SIDED SCIATICA: ICD-10-CM

## 2023-11-08 DIAGNOSIS — M47.9 SPONDYLOSIS: ICD-10-CM

## 2023-11-08 DIAGNOSIS — R73.03 PREDIABETES: ICD-10-CM

## 2023-11-08 DIAGNOSIS — Z71.82 EXERCISE COUNSELING: ICD-10-CM

## 2023-11-08 DIAGNOSIS — G89.29 CHRONIC LEFT-SIDED LOW BACK PAIN WITH LEFT-SIDED SCIATICA: ICD-10-CM

## 2023-11-08 DIAGNOSIS — M70.62 GREATER TROCHANTERIC BURSITIS OF LEFT HIP: ICD-10-CM

## 2023-11-08 DIAGNOSIS — M76.32 IT BAND SYNDROME, LEFT: ICD-10-CM

## 2023-11-08 DIAGNOSIS — Z71.3 DIETARY COUNSELING: ICD-10-CM

## 2023-11-08 PROCEDURE — 3079F DIAST BP 80-89 MM HG: CPT | Performed by: GENERAL PRACTICE

## 2023-11-08 PROCEDURE — 3075F SYST BP GE 130 - 139MM HG: CPT | Performed by: GENERAL PRACTICE

## 2023-11-08 PROCEDURE — 1125F AMNT PAIN NOTED PAIN PRSNT: CPT | Performed by: GENERAL PRACTICE

## 2023-11-08 PROCEDURE — 99204 OFFICE O/P NEW MOD 45 MIN: CPT | Mod: 25 | Performed by: GENERAL PRACTICE

## 2023-11-08 PROCEDURE — 1170F FXNL STATUS ASSESSED: CPT | Performed by: GENERAL PRACTICE

## 2023-11-08 ASSESSMENT — FIBROSIS 4 INDEX: FIB4 SCORE: 1.24

## 2023-11-08 ASSESSMENT — PAIN SCALES - GENERAL: PAINLEVEL: 3=SLIGHT PAIN

## 2023-11-08 ASSESSMENT — PATIENT HEALTH QUESTIONNAIRE - PHQ9: CLINICAL INTERPRETATION OF PHQ2 SCORE: 0

## 2023-11-08 NOTE — PROGRESS NOTES
Physiatry (Physical Medicine and  Rehabilitation)     Date of service: See epic    Chief complaint:   Chief Complaint   Patient presents with    Establish Care     Left Leg/hip Pain        Referring provider: Sonia Ramirez P.A.*    HISTORY    HPI:  Soledad Jordan is a 63 y.o. RHDd very pleasant female with past medical history of left leg pain and primary osteoarthritis of both hands who presents with a referral for left leg pain      Medical records review:  I reviewed the note from the referring provider Sonia Ramirez P.A.* including the note dated 9/1/2023.    Prior Procedures:   None.    Subacute on chronic low back pain with referred into left buttock.  Painful sitting.  Current pain level 3/10 and started off as 9/10.  Reported numbness and tingling at night.  Currently engaged with physical therapy and effective for therapy notes..  Ibuprofen is effective for pain control.  Affecting activities of daily living such as flexion, squatting, standing tolerance, and housework tolerance.      In addition, reported left greater trochanter pain reproducible.  Difficulty lying on side.  Restricting physical therapy.  Pain also at Gerdy's tubercle. would like to consider injection without corticosteroid.      ROS:   Red Flags ROS:   Fever, Chills, Sweats: Denies  Involuntary Weight Loss: Denies  Bladder Incontinence: Denies  Bowel Incontinence: denies  Saddle Anesthesia: Denies          GOALS OF TREATMENT: Symptom/Pain relief. improve function.        PMHx:   Past Medical History:   Diagnosis Date    Dyslipidemia     GERD (gastroesophageal reflux disease)     H/O mammogram 2013    normal    Pap smear for cervical cancer screening 2012    normal    PUD (peptic ulcer disease)        PSHx:   Past Surgical History:   Procedure Laterality Date    OTHER ORTHOPEDIC SURGERY  01/01/2009    left shoulder    OTHER ORTHOPEDIC SURGERY  01/01/2005    left knee    ABDOMINAL HYSTERECTOMY TOTAL      ovaries/uterus     APPENDECTOMY      CHOLECYSTECTOMY      OTHER ORTHOPEDIC SURGERY Left     bunion left foot       Family history   Family History   Problem Relation Age of Onset    Cancer Neg Hx     Ovarian Cancer Neg Hx     Tubal Cancer Neg Hx     Peritoneal Cancer Neg Hx     Colorectal Cancer Neg Hx     Breast Cancer Neg Hx     Bilateral Breast Cancer Neg Hx     Diabetes Neg Hx     Heart Disease Neg Hx     Hypertension Neg Hx        Medications: reviewed on epic.   Outpatient Medications Marked as Taking for the 11/8/23 encounter (Office Visit) with Abilio Slater D.O.   Medication Sig Dispense Refill    benzonatate (TESSALON) 100 MG Cap Take 1 Capsule by mouth 3 times a day as needed for Cough. 60 Capsule 0    ondansetron (ZOFRAN ODT) 4 MG TABLET DISPERSIBLE Take 1 Tablet by mouth every 6 hours as needed for Nausea/Vomiting for up to 15 doses. 15 Tablet 0    fluconazole (DIFLUCAN) 150 MG tablet Use 1 pill (one dose) as needed for symptoms of yeast infection. May repeat after 3 days if needed. 5 Tablet 0    triamcinolone acetonide (KENALOG) 0.025 % Cream Apply  topically 1 time a day as needed.      alendronate (FOSAMAX) 70 MG Tab Take 1 Tablet by mouth every 7 days. 12 Tablet 3    omeprazole (PRILOSEC) 20 MG delayed-release capsule TAKE 1 CAPSULE BY MOUTH ONCE DAILY 30 MINUTES BEFORE MORNING MEAL 90 Capsule 3        Allergies:   Allergies   Allergen Reactions    Phenergan [Promethazine Hcl] Swelling     Of the FACE     Oxycodone-Acetaminophen Unspecified    Percocet [Oxycodone-Acetaminophen] Vomiting       Social Hx:   Social History     Socioeconomic History    Marital status:      Spouse name: Not on file    Number of children: Not on file    Years of education: Not on file    Highest education level: Not on file   Occupational History    Not on file   Tobacco Use    Smoking status: Never    Smokeless tobacco: Never   Vaping Use    Vaping Use: Never used   Substance and Sexual Activity    Alcohol use: No    Drug use: No     "Sexual activity: Yes     Partners: Male   Other Topics Concern    Not on file   Social History Narrative    Not on file     Social Determinants of Health     Financial Resource Strain: Not on file   Food Insecurity: Not on file   Transportation Needs: Not on file   Physical Activity: Not on file   Stress: Not on file   Social Connections: Not on file   Intimate Partner Violence: Not on file   Housing Stability: Not on file         EXAMINATION   Vitals: /84 (BP Location: Left arm, Patient Position: Sitting, BP Cuff Size: Adult)   Pulse 82   Temp 36.4 °C (97.6 °F) (Temporal)   Ht 1.575 m (5' 2\")   Wt 58.6 kg (129 lb 3.2 oz)   SpO2 96%   Physical Exam:     Body Habitus: Body mass index is 23.63 kg/m².  Appearance: Well-groomed, well-nourished, not disheveled  Eyes: No scleral icterus to suggest severe liver disease, no proptosis to suggest severe hyperthyroid  ENT -no obvious auditory deficits, no external lesions, moist mucus membranes   Skin -no rashes or lesions noted. No appreciable skin breakdown on exposed skin areas.    Respiratory-  breathing comfortably on room air, no audible wheezing, full sentences  Cardiovascular- No lower extremity edema noted.   Psychiatric- alert and oriented,  calm, comfortable, cooperative     Musculoskeletal and Neuro:  Gait and station - normal gait with reciprocal pattern,  no use of ambulatory device, no arm assistance with sit-to-stand, nonantalgic. no loss of balance.  No change in patient's demeanor with exam.    Grossly normal cranial nerve exam and sensory exam grossly  Coordination grossly intact  Single leg balance / Trendelenburg:  normal in balance and control     Thoracic/Lumbar Spine/Sacral Spine/Hips   Inspection: No evidence of atrophy in bilateral lower extremities throughout     ROM: full  active range of motion with flexion, lateral flexion, and rotation bilaterally.   There is full  active range of motion with lumbar extension without pain.    There is " no pain with facet loading maneuver (extension rotation) with axial low back pain on the BILATERAL side(s)    Palpation:   No tenderness to palpation in midline at T1-T12 levels. No tenderness to palpation in the left and right of the midline T1-L5  palpation over SI joint: negative bilaterally  palpation in hip or over the gluteus medius tendon insertion: negative right, positive left inferior side  Tenderness to palpation along IT band and at Gerdy's tubercle left    Lumbar spine Special tests  Neuro tension  Straight leg test negative bilaterally     HIP  FAIR test negative right, positive left   Range of motion in the RIGHT hip is full  in flexion, extension, abduction, internal rotation, external rotation.  Range of motion in the LEFT hip is full  in flexion, extension, abduction, internal rotation, external rotation.  Pedrito negative right, positive left  Hamstring tightness with forward flexion    SI joint tests  Observation patient sits on one buttocks: Positive  SI joint compression negative bilaterally    SI joint distraction negative bilaterally  Thigh thrust test negative right, positive left   PEDRITO test negative right, positive left    Key points for the international standards for neurological classification of spinal cord injury (ISNCSCI) to light touch.   Dermatome R L   L2 2 2   L3 2 2   L4 2 2   L5 2 2   S1 2 2     Motor Exam Lower Extremities  ? Myotome R L   Hip flexion L2 5 4+ pain   Knee extension L3 5 5   Ankle dorsiflexion L4 5 5   Toe extension L5 5 5   Ankle plantarflexion S1 5 5   Hip Abduction  4+ 4+   Hip Adduction  5 5     Reflexes  Clonus of the ankle negative bilaterally       MEDICAL DECISION MAKING    Medical records review: see under HPI section.     DATA    Labs:   Lab Results   Component Value Date/Time    SODIUM 136 02/09/2023 07:56 AM    POTASSIUM 4.2 02/09/2023 07:56 AM    CHLORIDE 101 02/09/2023 07:56 AM    CO2 27 02/09/2023 07:56 AM    ANION 8.0 02/09/2023 07:56 AM     "GLUCOSE 102 (H) 02/09/2023 07:56 AM    BUN 11 02/09/2023 07:56 AM    CREATININE 0.64 02/09/2023 07:56 AM    CALCIUM 9.8 02/09/2023 07:56 AM    ASTSGOT 24 02/09/2023 07:56 AM    ALTSGPT 18 02/09/2023 07:56 AM    TBILIRUBIN 0.5 02/09/2023 07:56 AM    ALBUMIN 4.9 02/09/2023 07:56 AM    TOTPROTEIN 7.6 02/09/2023 07:56 AM    GLOBULIN 2.7 02/09/2023 07:56 AM    AGRATIO 1.8 02/09/2023 07:56 AM   ]    No results found for: \"PROTHROMBTM\", \"INR\"     Lab Results   Component Value Date/Time    WBC 6.1 08/16/2023 06:56 AM    RBC 4.30 08/16/2023 06:56 AM    HEMOGLOBIN 13.4 08/16/2023 06:56 AM    HEMATOCRIT 41.1 08/16/2023 06:56 AM    MCV 95.6 08/16/2023 06:56 AM    MCH 31.2 08/16/2023 06:56 AM    MCHC 32.6 08/16/2023 06:56 AM    MPV 10.3 08/16/2023 06:56 AM    NEUTSPOLYS 47.60 02/09/2023 07:56 AM    LYMPHOCYTES 43.50 (H) 02/09/2023 07:56 AM    MONOCYTES 7.20 02/09/2023 07:56 AM    EOSINOPHILS 1.00 02/09/2023 07:56 AM    BASOPHILS 0.50 02/09/2023 07:56 AM        Lab Results   Component Value Date/Time    HBA1C 5.8 (H) 08/16/2023 06:56 AM        Imaging:   I personally reviewed following images, these are my reads  Left hip x-ray 9/6/2023: Visible lumbar spondylosis.  L5-S1 facet arthropathy.  Subtle SI joint pathology bilaterally.  Osteoarthritis of the greater trochanters    IMAGING radiology reads. I reviewed the following radiology reads         9/1/2023 10:28 AM     HISTORY/REASON FOR EXAM:  Atraumatic Pelvic/Hip Pain; left SI joint/left hip/left leg pain.  LEFT hip pain for one month.     TECHNIQUE/EXAM DESCRIPTION AND NUMBER OF VIEWS:  2 views of the LEFT hip.     COMPARISON: None     FINDINGS:  Pelvis and sacrum are intact  Visualized LEFT proximal femur is intact and normally located  Mild loss of joint space in both hips.  Calcified phleboliths in the pelvis.     IMPRESSION:     1.  No LEFT hip or pelvic fracture.  2.  Mild degenerative change of both hips.                                                                   "                                    Diagnosis   Visit Diagnoses     ICD-10-CM   1. Chronic left-sided low back pain with left-sided sciatica  M54.42    G89.29   2. Greater trochanteric bursitis of left hip  M70.62   3. Spondylosis  M47.9   4. Dietary counseling  Z71.3   5. Exercise counseling  Z71.82   6. Prediabetes  R73.03       ASSESSMENT AND PLAN:  Soledad Jordan 63 y.o. female  Patient with historical and clinical evidence consistent with left greater trochanter syndrome with IT band syndrome.  Arthritic changes noted on x-ray of left hip with positive inferior tenderness to palpation,  at insertion Gerdy's tubercle. Patient interested in injection and physical therapy and current pain regimen.   Prediabetes: we will avoid corticosteroid  Dietary and exercising counseling discussed.  Extensive discussion regarding treatment options, at this time recommending the following:    Orders Placed This Encounter    Consent for all Surgical, Special Diagnostic or Therapeutic Procedures         PLAN  I did have an extensive discussion regarding pathology and treatment options with the patient today including medications, injections, physical agents (eg. water, heat, cold, TENS), therapy-based programs (eg. massage, stretching/traction, exercise), alternative modalities, and lastly surgical intervention:  -Medications/Modalities:   Continue previously prescribed medications  -Limitations:     No heavy lifting, bending or twisting advance as directed with physical therapy  -Brace/orthotic/assistive devices: Not indicated at this time  -Therapy (PT/OT/Aquatherapy): ordered to focus on strengthening and stretching  -Home exercise program: encouraged and demonstrated home exercises of regular strengthening and stretching  -Office Injections: will perform with this visit  Patient would like to proceed with injection of left greater trochanteric.  The risks, benefits, and alternatives to this procedure were discussed and the  patient wishes to proceed with the procedure. Risks include but are not limited to damage to surrounding structures, infection, bleeding, worsening of pain which can be permanent, and weakness which can be permanent. Benefits include pain relief and improved function. Alternatives include not doing the procedure.   Diagnostic workup: reviewed today as above  Interventional program: I would  consider the patient for an epidural steroid injection depending on the results of the above   Referrals: none     Follow-up:    Return to clinic in 2 weeks for follow-up of symptomatology, or sooner as needed for any acute issues.  Patient expressed understanding of the management plan. Patient (and Family Members) was/were encouraged to call if any worries, issues, problems or concerns prior to the next visit     Please note that this dictation was created using voice recognition software. I have made every reasonable attempt to correct obvious errors but there may be errors of grammar and content that I may have overlooked prior to finalization of this note.      Abilio Slater, DO  Physical Medicine and Rehabilitation  [facility]         CONCHA JacoboC.   Sonia Bailey PNILE.*

## 2023-11-15 ENCOUNTER — PHYSICAL THERAPY (OUTPATIENT)
Dept: PHYSICAL THERAPY | Facility: REHABILITATION | Age: 63
End: 2023-11-15
Attending: PHYSICIAN ASSISTANT
Payer: MEDICARE

## 2023-11-15 DIAGNOSIS — G89.29 CHRONIC LEFT SI JOINT PAIN: ICD-10-CM

## 2023-11-15 DIAGNOSIS — M53.3 CHRONIC LEFT SI JOINT PAIN: ICD-10-CM

## 2023-11-15 DIAGNOSIS — M79.605 LEFT LEG PAIN: ICD-10-CM

## 2023-11-15 PROCEDURE — 97014 ELECTRIC STIMULATION THERAPY: CPT

## 2023-11-15 PROCEDURE — 97110 THERAPEUTIC EXERCISES: CPT

## 2023-11-15 NOTE — OP THERAPY DAILY TREATMENT
Outpatient Physical Therapy  DAILY TREATMENT     [facility] [place]  [address], [address]  [place] [address]  Phone:  [phone]  Fax:  [phone]    Date: 11/15/2023    Patient: Soledad Jordan  YOB: 1960  MRN: [id number]     Time Calculation    Start time: 0830  Stop time: 0930 Time Calculation (min): 60 minutes         Chief Complaint: No chief complaint on file.    Visit #: 7    SUBJECTIVE:  Patient reports that she had an injection. She noted relief for only 8 hours. Patient reports that the next day symptoms fully returned.     OBJECTIVE:  Current objective measures:           Therapeutic Exercises (CPT 15057):     1. Nu step L1, x10min    2. press ups, x15  (HEP x10 every 3-4 hours), HOLD    3. clams, xfatigue    4. Basic tra with LE lifts, x4min    5. Seated hip abd, xfatigue    6. Sit to stand, x10, HOLD    7. supine 90/90, x30    8. piriformis stretch, x30    9. HS stretch, [id number]    10. DKTC stretch, x5 hold 5sec      Therapeutic Exercise Summary: Access Code: [email or web address]  URL: [email or web address]  Date: 10/04/2023  Prepared by: Gerson Cantu    Exercises  - Supine Transversus Abdominis Bracing - Hands on Stomach  - 1 x daily - 7 x weekly - 2 sets - 10 reps  - Sit to Stand with Counter Support  - 1 x daily - 7 x weekly - 2 sets - 10 reps HOLD  - Seated Hip Abduction with Resistance  - 1 x daily - 7 x weekly - 2 sets - 10 reps  - Seated Multifidi Isometric  - 1 x daily - 7 x weekly - 2 sets - 10 reps  - Prone on Elbows Stretch  - 4 x daily - 7 x weekly - 1 sets - 1 reps - 45sec hold HOLD  - Supine Bridge  - 1 x daily - 7 x weekly - 2 sets - 10 reps  - Supine Hamstring Stretch  - 3 x daily - 7 x weekly - 1 sets - 3 reps - 15sec hold  - Modified Martinez Stretch  - 3 x daily - 7 x weekly - 1 sets - 3 reps - 15sec hold  - Tandem Stance in Corner  - 1 x daily - 7 x weekly - 2 sets - 10 reps    Therapeutic Treatments and Modalities:     1. E Stim Unattended  (CPT 77863), IFC with HP x15min 80-150hz, Lumbar    Time-based treatments/modalities:    Physical Therapy Timed Treatment Charges  Therapeutic exercise minutes (CPT 34064): 40 minutes      Pain rating (1-10) before treatment:  3  Pain rating (1-10) after treatment:  1    ASSESSMENT:   Response to treatment: Patient responded well to therapy with a decrease in pain. Patient to continue with HEP and follow up with MD at next session re: injection.     PLAN/RECOMMENDATIONS:   Plan for treatment: therapy treatment to continue next visit.  Planned interventions for next visit: continue with current treatment.

## 2023-11-17 ENCOUNTER — PHYSICAL THERAPY (OUTPATIENT)
Dept: PHYSICAL THERAPY | Facility: REHABILITATION | Age: 63
End: 2023-11-17
Attending: PHYSICIAN ASSISTANT
Payer: MEDICARE

## 2023-11-17 DIAGNOSIS — G89.29 CHRONIC LEFT SI JOINT PAIN: ICD-10-CM

## 2023-11-17 DIAGNOSIS — M53.3 CHRONIC LEFT SI JOINT PAIN: ICD-10-CM

## 2023-11-17 DIAGNOSIS — M79.605 LEFT LEG PAIN: ICD-10-CM

## 2023-11-17 PROCEDURE — 97014 ELECTRIC STIMULATION THERAPY: CPT

## 2023-11-17 PROCEDURE — 97110 THERAPEUTIC EXERCISES: CPT

## 2023-11-17 NOTE — OP THERAPY DAILY TREATMENT
Outpatient Physical Therapy  DAILY TREATMENT     [facility] Outpatient Physical Therapy [facility]  [address], [address]  [place] [address]  Phone:  [phone]  Fax:  [phone]    Date: 11/17/2023    Patient: Soledad Jordan  YOB: 1960  MRN: [id number]     Time Calculation    Start time: 0900  Stop time: 0945 Time Calculation (min): 45 minutes         Chief Complaint: Back Problem    Visit #: 8    SUBJECTIVE:  Patient reports no change.     OBJECTIVE:  Current objective measures:           Therapeutic Exercises (CPT 59562):     1. Nu step L1, x10min    2. press ups, x15  (HEP x10 every 3-4 hours), HOLD    3. clams, xfatigue    4. Basic tra with LE lifts, x4min    5. Seated hip abd, xfatigue    6. Sit to stand, x10, HOLD    7. supine 90/90, x30    8. piriformis stretch, x30    9. HS stretch, [id number]    10. DKTC stretch, x5 hold 5sec      Therapeutic Exercise Summary: Access Code: [email or web address]  URL: [email or web address]  Date: 10/04/2023  Prepared by: Gerson Acntu    Exercises  - Supine Transversus Abdominis Bracing - Hands on Stomach  - 1 x daily - 7 x weekly - 2 sets - 10 reps  - Sit to Stand with Counter Support  - 1 x daily - 7 x weekly - 2 sets - 10 reps HOLD  - Seated Hip Abduction with Resistance  - 1 x daily - 7 x weekly - 2 sets - 10 reps  - Seated Multifidi Isometric  - 1 x daily - 7 x weekly - 2 sets - 10 reps  - Prone on Elbows Stretch  - 4 x daily - 7 x weekly - 1 sets - 1 reps - 45sec hold HOLD  - Supine Bridge  - 1 x daily - 7 x weekly - 2 sets - 10 reps  - Supine Hamstring Stretch  - 3 x daily - 7 x weekly - 1 sets - 3 reps - 15sec hold  - Modified Martinez Stretch  - 3 x daily - 7 x weekly - 1 sets - 3 reps - 15sec hold  - Tandem Stance in Corner  - 1 x daily - 7 x weekly - 2 sets - 10 reps    Therapeutic Treatments and Modalities:     1. E Stim Unattended (CPT 11162), IFC with HP x15min 80-150hz, Lumbar    Time-based treatments/modalities:    Physical Therapy Timed Treatment  Charges  Therapeutic exercise minutes (CPT 64164): 30 minutes      Pain rating (1-10) before treatment:  5  Pain rating (1-10) after treatment:  2    ASSESSMENT:   Response to treatment: Patient symptoms continue to be persistent recommend return to MD for MRI and possible ALEXANDRA.     PLAN/RECOMMENDATIONS:   Plan for treatment: Return to MD  Planned interventions for next visit: Return to MD

## 2023-11-26 NOTE — PROGRESS NOTES
Physiatry (Physical Medicine and  Rehabilitation)     Date of service: See epic    Chief complaint:   Chief Complaint   Patient presents with    Follow-Up     2wk fv        Referring provider: Sonia Ramirez P.A.*    HISTORY    HPI:  Soledad Jordan is a 63 y.o. RHDd very pleasant female with past medical history of left leg pain and primary osteoarthritis of both hands who presents with a referral for left leg pain      Medical records review:  I reviewed the note from the referring provider Sonia Ramirez P.A.* including the note dated 9/1/2023.    Management plan 11/8/2023  -Medications/Modalities:   Continue previously prescribed medications  -Limitations:     No heavy lifting, bending or twisting advance as directed with physical therapy  -Brace/orthotic/assistive devices: Not indicated at this time  -Therapy (PT/OT/Aquatherapy): ordered to focus on strengthening and stretching  -Home exercise program: encouraged and demonstrated home exercises of regular strengthening and stretching  -Office Injections: will perform with this visit  Patient would like to proceed with injection of left greater trochanteric.  The risks, benefits, and alternatives to this procedure were discussed and the patient wishes to proceed with the procedure. Risks include but are not limited to damage to surrounding structures, infection, bleeding, worsening of pain which can be permanent, and weakness which can be permanent. Benefits include pain relief and improved function. Alternatives include not doing the procedure.   Diagnostic workup: reviewed today as above  Interventional program: I would  consider the patient for an epidural steroid injection depending on the results of the above   Referrals: none     Prior Procedures:   11/8/2023 Ultrasound-Guided left greater trochanteric with no corticosteroid and 1 day relief.    Today, 11/29/2023 since last visit, patient has reported no significant improvement status post left  greater trochanter injection.  No new medical testing.  No new physical therapy appointments.  Still can sit for 1 hour, walk for 45 minutes, stand for 3 to 4 hours.  Still sleeping for 7 hours.  No other medications.  Still stated pain is aching along gluteal.  Still burning and tingling sensations down the left lateral leg. Pain mostly at the end of day.  No recent injury or trauma.  No associated symptoms:  no radiation of pain down BLE, incontinence, progressive weakness, or saddle anesthesia.     Patient reported left plantar foot pain.  Sharp with palpation and worse in the mornings.    11/8/23 Subacute on chronic low back pain with referred into left buttock.  Painful sitting.  Current pain level 3/10 and started off as 9/10.  Reported numbness and tingling at night.  Currently engaged with physical therapy and effective for therapy notes..  Ibuprofen is effective for pain control.  Affecting activities of daily living such as flexion, squatting, standing tolerance, and housework tolerance.      In addition, reported left greater trochanter pain reproducible.  Difficulty lying on side.  Restricting physical therapy.  Pain also at Gerdy's tubercle. would like to consider injection without corticosteroid.      ROS:   Red Flags ROS:   Fever, Chills, Sweats: Denies  Involuntary Weight Loss: Denies  Bladder Incontinence: Denies  Bowel Incontinence: denies  Saddle Anesthesia: Denies          GOALS OF TREATMENT: Symptom/Pain relief. improve function.        PMHx:   Past Medical History:   Diagnosis Date    Dyslipidemia     GERD (gastroesophageal reflux disease)     H/O mammogram 2013    normal    Pap smear for cervical cancer screening 2012    normal    PUD (peptic ulcer disease)        PSHx:   Past Surgical History:   Procedure Laterality Date    OTHER ORTHOPEDIC SURGERY  01/01/2009    left shoulder    OTHER ORTHOPEDIC SURGERY  01/01/2005    left knee    ABDOMINAL HYSTERECTOMY TOTAL      ovaries/uterus     APPENDECTOMY      CHOLECYSTECTOMY      OTHER ORTHOPEDIC SURGERY Left     bunion left foot       Family history   Family History   Problem Relation Age of Onset    Cancer Neg Hx     Ovarian Cancer Neg Hx     Tubal Cancer Neg Hx     Peritoneal Cancer Neg Hx     Colorectal Cancer Neg Hx     Breast Cancer Neg Hx     Bilateral Breast Cancer Neg Hx     Diabetes Neg Hx     Heart Disease Neg Hx     Hypertension Neg Hx        Medications: reviewed on epic.   Outpatient Medications Marked as Taking for the 11/29/23 encounter (Office Visit) with Abilio Slater D.O.   Medication Sig Dispense Refill    benzonatate (TESSALON) 100 MG Cap Take 1 Capsule by mouth 3 times a day as needed for Cough. 60 Capsule 0    ondansetron (ZOFRAN ODT) 4 MG TABLET DISPERSIBLE Take 1 Tablet by mouth every 6 hours as needed for Nausea/Vomiting for up to 15 doses. 15 Tablet 0    fluconazole (DIFLUCAN) 150 MG tablet Use 1 pill (one dose) as needed for symptoms of yeast infection. May repeat after 3 days if needed. 5 Tablet 0    triamcinolone acetonide (KENALOG) 0.025 % Cream Apply  topically 1 time a day as needed.      alendronate (FOSAMAX) 70 MG Tab Take 1 Tablet by mouth every 7 days. 12 Tablet 3    omeprazole (PRILOSEC) 20 MG delayed-release capsule TAKE 1 CAPSULE BY MOUTH ONCE DAILY 30 MINUTES BEFORE MORNING MEAL 90 Capsule 3        Allergies:   Allergies   Allergen Reactions    Phenergan [Promethazine Hcl] Swelling     Of the FACE     Oxycodone-Acetaminophen Unspecified    Percocet [Oxycodone-Acetaminophen] Vomiting       Social Hx:   Social History     Socioeconomic History    Marital status:      Spouse name: Not on file    Number of children: Not on file    Years of education: Not on file    Highest education level: Not on file   Occupational History    Not on file   Tobacco Use    Smoking status: Never    Smokeless tobacco: Never   Vaping Use    Vaping Use: Never used   Substance and Sexual Activity    Alcohol use: No    Drug use: No     "Sexual activity: Yes     Partners: Male   Other Topics Concern    Not on file   Social History Narrative    Not on file     Social Determinants of Health     Financial Resource Strain: Not on file   Food Insecurity: Not on file   Transportation Needs: Not on file   Physical Activity: Not on file   Stress: Not on file   Social Connections: Not on file   Intimate Partner Violence: Not on file   Housing Stability: Not on file         EXAMINATION   Vitals: /68 (BP Location: Left arm, Patient Position: Sitting, BP Cuff Size: Adult)   Pulse 75   Temp 36.2 °C (97.1 °F) (Temporal)   Ht 1.575 m (5' 2\")   Wt 59 kg (130 lb)   SpO2 97%   Physical Exam:     Body Habitus: Body mass index is 23.78 kg/m².  Appearance: Well-groomed, well-nourished, not disheveled  Eyes: No scleral icterus to suggest severe liver disease, no proptosis to suggest severe hyperthyroid  ENT -no obvious auditory deficits, no external lesions, moist mucus membranes   Skin -no rashes or lesions noted. No appreciable skin breakdown on exposed skin areas.    Respiratory-  breathing comfortably on room air, no audible wheezing, full sentences  Cardiovascular- No lower extremity edema noted.   Psychiatric- alert and oriented,  calm, comfortable, cooperative     Musculoskeletal and Neuro:  Gait and station - normal gait with reciprocal pattern,  no use of ambulatory device, no arm assistance with sit-to-stand, nonantalgic. no loss of balance.  No change in patient's demeanor with exam.    Grossly normal cranial nerve exam and sensory exam grossly  Coordination grossly intact  Single leg balance / Trendelenburg:  normal in balance and control     Thoracic/Lumbar Spine/Sacral Spine/Hips   Inspection: No evidence of atrophy in bilateral lower extremities throughout     ROM: full  active range of motion with flexion, lateral flexion, and rotation bilaterally.   There is full  active range of motion with lumbar extension without pain.    There is no pain " with facet loading maneuver (extension rotation) with axial low back pain on the BILATERAL side(s)    Palpation:   No tenderness to palpation in midline at T1-T12 levels. No tenderness to palpation in the left and right of the midline T1-L5  palpation over SI joint: negative bilaterally  palpation in hip or over the gluteus medius tendon insertion: negative right, positive left inferior side    Tenderness to palpation along IT band and at Gerdy's tubercle left   Left PSIS tenderness to palpation.     Lumbar spine Special tests  Neuro tension  Straight leg test negative bilaterally     HIP  FAIR test negative bilaterally   Range of motion in the RIGHT hip is full  in flexion, extension, abduction, internal rotation, external rotation.  Range of motion in the LEFT hip is full  in flexion, extension, abduction, internal rotation, external rotation.  Pedrito negative bilaterally  Hamstring tightness with forward flexion  Negative gaenslen test bilaterally    Left plantar fascia TTP    SI joint tests  Observation patient sits on one buttocks: Positive  SI joint compression negative bilaterally    SI joint distraction negative bilaterally  Thigh thrust test negative bilaterally   PEDRITO test negative bilaterally    Key points for the international standards for neurological classification of spinal cord injury (ISNCSCI) to light touch.   Dermatome R L   L2 2 2   L3 2 2   L4 2 2   L5 2 2   S1 2 2     Motor Exam Lower Extremities  ? Myotome R L   Hip flexion L2 5 4+    Knee extension L3 5 5   Ankle dorsiflexion L4 5 5   Toe extension L5 5 5   Ankle plantarflexion S1 5 5   Hip Abduction  4+ 4+   Hip Adduction  4+ 4+     Reflexes  Clonus of the ankle negative bilaterally       MEDICAL DECISION MAKING    Medical records review: see under HPI section.     DATA    Labs:   Lab Results   Component Value Date/Time    SODIUM 136 02/09/2023 07:56 AM    POTASSIUM 4.2 02/09/2023 07:56 AM    CHLORIDE 101 02/09/2023 07:56 AM    CO2 27  "02/09/2023 07:56 AM    ANION 8.0 02/09/2023 07:56 AM    GLUCOSE 102 (H) 02/09/2023 07:56 AM    BUN 11 02/09/2023 07:56 AM    CREATININE 0.64 02/09/2023 07:56 AM    CALCIUM 9.8 02/09/2023 07:56 AM    ASTSGOT 24 02/09/2023 07:56 AM    ALTSGPT 18 02/09/2023 07:56 AM    TBILIRUBIN 0.5 02/09/2023 07:56 AM    ALBUMIN 4.9 02/09/2023 07:56 AM    TOTPROTEIN 7.6 02/09/2023 07:56 AM    GLOBULIN 2.7 02/09/2023 07:56 AM    AGRATIO 1.8 02/09/2023 07:56 AM   ]    No results found for: \"PROTHROMBTM\", \"INR\"     Lab Results   Component Value Date/Time    WBC 6.1 08/16/2023 06:56 AM    RBC 4.30 08/16/2023 06:56 AM    HEMOGLOBIN 13.4 08/16/2023 06:56 AM    HEMATOCRIT 41.1 08/16/2023 06:56 AM    MCV 95.6 08/16/2023 06:56 AM    MCH 31.2 08/16/2023 06:56 AM    MCHC 32.6 08/16/2023 06:56 AM    MPV 10.3 08/16/2023 06:56 AM    NEUTSPOLYS 47.60 02/09/2023 07:56 AM    LYMPHOCYTES 43.50 (H) 02/09/2023 07:56 AM    MONOCYTES 7.20 02/09/2023 07:56 AM    EOSINOPHILS 1.00 02/09/2023 07:56 AM    BASOPHILS 0.50 02/09/2023 07:56 AM        Lab Results   Component Value Date/Time    HBA1C 5.8 (H) 08/16/2023 06:56 AM        Imaging:   I personally reviewed following images, these are my reads  Left hip x-ray 9/6/2023: Visible lumbar spondylosis.  L5-S1 facet arthropathy.  Subtle SI joint pathology bilaterally.  Osteoarthritis of the greater trochanters    IMAGING radiology reads. I reviewed the following radiology reads         9/1/2023 10:28 AM     HISTORY/REASON FOR EXAM:  Atraumatic Pelvic/Hip Pain; left SI joint/left hip/left leg pain.  LEFT hip pain for one month.     TECHNIQUE/EXAM DESCRIPTION AND NUMBER OF VIEWS:  2 views of the LEFT hip.     COMPARISON: None     FINDINGS:  Pelvis and sacrum are intact  Visualized LEFT proximal femur is intact and normally located  Mild loss of joint space in both hips.  Calcified phleboliths in the pelvis.     IMPRESSION:     1.  No LEFT hip or pelvic fracture.  2.  Mild degenerative change of both hips.           "                                                                                            Diagnosis   Visit Diagnoses     ICD-10-CM   1. Greater trochanteric bursitis of left hip  M70.62   2. It band syndrome, left  M76.32   3. Facet arthropathy, lumbosacral  M47.817   4. Spondylosis  M47.9   5. Plantar fasciitis of left foot  M72.2   6. Hamstring tightness of both lower extremities  M62.9   7. Prediabetes  R73.03   8. Dietary counseling  Z71.3   9. Exercise counseling  Z71.82         ASSESSMENT AND PLAN:  Soledad Jordan 63 y.o. female  Likely greater trochanter syndrome left as inferior greater tubercle injection provided pain relief for 1 day as I did not provide corticosteroid.  She still has tenderness palpation of the left trochanteric and left PSIS iliac joint test.  Contributing factor may be muscle tightness such as her hamstrings specifically.  She also did present with IT band syndrome with tenderness to palpation at Ciera's tubercle.  Also questionable lumbar radiculopathy as she does have weak abductors and abductors but suspect this may just be secondary to muscle imbalance.  Says the pain is worse at night medications are effective, will continue to educated patient on dosages.  Encourage patient to continue home exercise program and physical therapy.  If pain continues, would consider injection with corticosteroid    Left plantar fasciitis: Tenderness to palpation and painful in the morning, painful with flexion; provided home exercise program  Prediabetes  Dietary and exercising counseling discussed.  Extensive discussion regarding treatment options, at this time recommending the following:    No orders of the defined types were placed in this encounter.        PLAN  I did have an extensive discussion regarding pathology and treatment options with the patient today including medications, injections, physical agents (eg. water, heat, cold, TENS), therapy-based programs (eg. massage,  stretching/traction, exercise), alternative modalities, and lastly surgical intervention:  -Medications/Modalities:   -Tylenol/acetaminophen 975mg (for example, 3 tabs of 325mg, or 2 tabs of 500mg) every 6-8 hours as needed for mild/moderate pain.  Do not take more than 3000mg of Tylenol in 24 hours.   NSAIDS for severe pain   -Ibuprofen 600mg (1 tab of 600mg-800mg) every 6-8 hours (take with food) as needed.  Do not take more than 2400 mg of ibuprofen in 24 hours. Take with milk or with food.  do not take with other NSAIDs such as naproxen, , Celebrex, meloxicam.  -You may also try ice packs or heating pads to help with pain for no more than 15 minutes at a time  -Limitations:   Activity as tolerated  -Brace/orthotic/assistive devices: Not indicated at this time  -Therapy (PT/OT/Aquatherapy): Continue to focus on strengthening and stretching  -Home exercise program: encouraged and demonstrated home exercises of regular strengthening and stretching and provided exercise program for plantar fasciitis  -Office Injections: will consider at a later time and will consider corticosteroid  Diagnostic workup: reviewed today as above  Interventional program: I would  consider the patient for an epidural steroid injection depending on the results of the above   Referrals: none     Follow-up:    Return to clinic in 12 weeks for follow-up of symptomatology, or sooner as needed for any acute issues.  Patient expressed understanding of the management plan. Patient (and Family Members) was/were encouraged to call if any worries, issues, problems or concerns prior to the next visit     Please note that this dictation was created using voice recognition software. I have made every reasonable attempt to correct obvious errors but there may be errors of grammar and content that I may have overlooked prior to finalization of this note.      Abilio Slater, DO  Physical Medicine and Rehabilitation  [facility]         DMITRI CARRENO  ABIMAEL Ramirez.   CC Sonia Ramirez P.A.*

## 2023-11-29 ENCOUNTER — OFFICE VISIT (OUTPATIENT)
Dept: PHYSICAL MEDICINE AND REHAB | Facility: MEDICAL CENTER | Age: 63
End: 2023-11-29
Payer: MEDICARE

## 2023-11-29 VITALS
OXYGEN SATURATION: 97 % | HEIGHT: 62 IN | DIASTOLIC BLOOD PRESSURE: 68 MMHG | SYSTOLIC BLOOD PRESSURE: 118 MMHG | BODY MASS INDEX: 23.92 KG/M2 | TEMPERATURE: 97.1 F | HEART RATE: 75 BPM | WEIGHT: 130 LBS

## 2023-11-29 DIAGNOSIS — Z71.3 DIETARY COUNSELING: ICD-10-CM

## 2023-11-29 DIAGNOSIS — M47.817 FACET ARTHROPATHY, LUMBOSACRAL: ICD-10-CM

## 2023-11-29 DIAGNOSIS — M76.32 IT BAND SYNDROME, LEFT: ICD-10-CM

## 2023-11-29 DIAGNOSIS — M47.9 SPONDYLOSIS: ICD-10-CM

## 2023-11-29 DIAGNOSIS — M70.62 GREATER TROCHANTERIC BURSITIS OF LEFT HIP: ICD-10-CM

## 2023-11-29 DIAGNOSIS — Z71.82 EXERCISE COUNSELING: ICD-10-CM

## 2023-11-29 DIAGNOSIS — M62.9 HAMSTRING TIGHTNESS OF BOTH LOWER EXTREMITIES: ICD-10-CM

## 2023-11-29 DIAGNOSIS — M72.2 PLANTAR FASCIITIS OF LEFT FOOT: ICD-10-CM

## 2023-11-29 DIAGNOSIS — R73.03 PREDIABETES: ICD-10-CM

## 2023-11-29 PROCEDURE — 99214 OFFICE O/P EST MOD 30 MIN: CPT | Performed by: GENERAL PRACTICE

## 2023-11-29 PROCEDURE — 1125F AMNT PAIN NOTED PAIN PRSNT: CPT | Performed by: GENERAL PRACTICE

## 2023-11-29 PROCEDURE — 3078F DIAST BP <80 MM HG: CPT | Performed by: GENERAL PRACTICE

## 2023-11-29 PROCEDURE — 3074F SYST BP LT 130 MM HG: CPT | Performed by: GENERAL PRACTICE

## 2023-11-29 PROCEDURE — 1170F FXNL STATUS ASSESSED: CPT | Performed by: GENERAL PRACTICE

## 2023-11-29 ASSESSMENT — PATIENT HEALTH QUESTIONNAIRE - PHQ9: CLINICAL INTERPRETATION OF PHQ2 SCORE: 0

## 2023-11-29 ASSESSMENT — FIBROSIS 4 INDEX: FIB4 SCORE: 1.24

## 2023-11-29 ASSESSMENT — PAIN SCALES - GENERAL: PAINLEVEL: 4=SLIGHT-MODERATE PAIN

## 2023-11-29 NOTE — PATIENT INSTRUCTIONS
Pain control:   -Tylenol/acetaminophen 975mg (for example, 3 tabs of 325mg, or 2 tabs of 500mg) every 6-8 hours as needed for mild/moderate pain.  Do not take more than 3000mg of Tylenol in 24 hours.   NSAIDS for severe pain   -Ibuprofen 600mg (1 tab of 600mg-800mg) every 6-8 hours (take with food) as needed.  Do not take more than 2400 mg of ibuprofen in 24 hours. Take with milk or with food.  do not take with other NSAIDs such as naproxen, , Celebrex, meloxicam.  -You may also try ice packs or heating pads to help with pain for no more than 15 minutes at a time

## 2023-12-07 ENCOUNTER — OFFICE VISIT (OUTPATIENT)
Dept: DERMATOLOGY | Facility: IMAGING CENTER | Age: 63
End: 2023-12-07
Payer: MEDICARE

## 2023-12-07 DIAGNOSIS — L29.9 ITCHING: ICD-10-CM

## 2023-12-07 DIAGNOSIS — L81.8 IDIOPATHIC GUTTATE HYPOMELANOSIS: ICD-10-CM

## 2023-12-07 DIAGNOSIS — L82.1 SEBORRHEIC KERATOSIS: ICD-10-CM

## 2023-12-07 PROCEDURE — 99203 OFFICE O/P NEW LOW 30 MIN: CPT | Performed by: DERMATOLOGY

## 2023-12-07 PROCEDURE — 1170F FXNL STATUS ASSESSED: CPT | Performed by: DERMATOLOGY

## 2023-12-07 NOTE — PROGRESS NOTES
CC: Establish Care and Skin Lesion       Subjective: New  patient here for spot on arm     HPI/location: rt arm  Time present:  5 yrs   Painful lesion: No  Itching lesion: Yes  Enlarging lesion: No  Anything make it better or worse?    Itching worsens in sun - sunscreen makes skin break out.     History of skin cancer: No  History of precancers/actinic keratoses: No  History of biopsies:No  History of blistering/severe sunburns:No  Family history of skin cancer:No  Family history of atypical moles:No    ROS: no fevers/chills. Occ itch.  No cough  Relevant PMH:NC  Social:NS    PE: Gen:WDWN female in NAD. Skin: focal exam: declined additional exam today.   Right arm - waxy flat-topped papule on lower forearm - appearing benign. Scattered whitened macules on arms.       A/P: SK, r arm:  -b/r    IGH: arms: benign  -advised sunprotection - consider sleeves/shade if does not tolerate sunscreens    Possible brachioradial pruritus vs PMLE:   -advised antihistamines-OTC  -limit sun exposure    F/u PRN    I have reviewed medications relevant to my specialty.

## 2024-01-17 ENCOUNTER — HOSPITAL ENCOUNTER (OUTPATIENT)
Dept: RADIOLOGY | Facility: MEDICAL CENTER | Age: 64
End: 2024-01-17
Attending: PHYSICIAN ASSISTANT
Payer: MEDICARE

## 2024-01-17 DIAGNOSIS — Z12.31 ENCOUNTER FOR SCREENING MAMMOGRAM FOR MALIGNANT NEOPLASM OF BREAST: ICD-10-CM

## 2024-01-17 PROCEDURE — 77067 SCR MAMMO BI INCL CAD: CPT

## 2024-01-18 DIAGNOSIS — M81.0 AGE-RELATED OSTEOPOROSIS WITHOUT CURRENT PATHOLOGICAL FRACTURE: ICD-10-CM

## 2024-01-19 NOTE — TELEPHONE ENCOUNTER
Received request via: Pharmacy    Was the patient seen in the last year in this department? Yes    Does the patient have an active prescription (recently filled or refills available) for medication(s) requested? No    Pharmacy Name: [facility]    Does the patient have [facility] and need 100 day supply (blood pressure, diabetes and cholesterol meds only)? Medication is not for cholesterol, blood pressure or diabetes

## 2024-01-20 RX ORDER — ALENDRONATE SODIUM 70 MG/1
70 TABLET ORAL
Qty: 12 TABLET | Refills: 3 | Status: SHIPPED | OUTPATIENT
Start: 2024-01-20

## 2024-01-20 NOTE — RESULT ENCOUNTER NOTE
Please let patient know her mammogram is normal. Repeat in one year.    Thank you,    Jacqueline Ramirez PA-C

## 2024-02-08 DIAGNOSIS — K21.9 GASTROESOPHAGEAL REFLUX DISEASE WITHOUT ESOPHAGITIS: ICD-10-CM

## 2024-02-08 RX ORDER — OMEPRAZOLE 20 MG/1
CAPSULE, DELAYED RELEASE ORAL
Qty: 90 CAPSULE | Refills: 0 | Status: SHIPPED | OUTPATIENT
Start: 2024-02-08

## 2024-02-08 NOTE — TELEPHONE ENCOUNTER
Received request via: Pharmacy    Was the patient seen in the last year in this department? Yes    Does the patient have an active prescription (recently filled or refills available) for medication(s) requested? No    Pharmacy Name: [facility]    Does the patient have [facility] and need 100 day supply (blood pressure, diabetes and cholesterol meds only)? Medication is not for cholesterol, blood pressure or diabetes

## 2024-02-21 ENCOUNTER — OFFICE VISIT (OUTPATIENT)
Dept: PHYSICAL MEDICINE AND REHAB | Facility: MEDICAL CENTER | Age: 64
End: 2024-02-21
Payer: MEDICARE

## 2024-02-21 VITALS
DIASTOLIC BLOOD PRESSURE: 80 MMHG | OXYGEN SATURATION: 97 % | BODY MASS INDEX: 23.04 KG/M2 | TEMPERATURE: 97.5 F | SYSTOLIC BLOOD PRESSURE: 138 MMHG | WEIGHT: 130 LBS | HEIGHT: 63 IN | HEART RATE: 68 BPM

## 2024-02-21 DIAGNOSIS — F43.21 GRIEF: ICD-10-CM

## 2024-02-21 DIAGNOSIS — Z71.82 EXERCISE COUNSELING: ICD-10-CM

## 2024-02-21 DIAGNOSIS — M70.62 GREATER TROCHANTERIC BURSITIS OF LEFT HIP: ICD-10-CM

## 2024-02-21 DIAGNOSIS — Z71.3 DIETARY COUNSELING: ICD-10-CM

## 2024-02-21 DIAGNOSIS — M54.42 CHRONIC LEFT-SIDED LOW BACK PAIN WITH LEFT-SIDED SCIATICA: ICD-10-CM

## 2024-02-21 DIAGNOSIS — M62.9 HAMSTRING TIGHTNESS OF BOTH LOWER EXTREMITIES: ICD-10-CM

## 2024-02-21 DIAGNOSIS — G89.29 CHRONIC PAIN OF LEFT ANKLE: ICD-10-CM

## 2024-02-21 DIAGNOSIS — R73.03 PREDIABETES: ICD-10-CM

## 2024-02-21 DIAGNOSIS — M47.9 SPONDYLOSIS: ICD-10-CM

## 2024-02-21 DIAGNOSIS — M25.572 CHRONIC PAIN OF LEFT ANKLE: ICD-10-CM

## 2024-02-21 DIAGNOSIS — M76.32 IT BAND SYNDROME, LEFT: ICD-10-CM

## 2024-02-21 DIAGNOSIS — M72.2 PLANTAR FASCIITIS OF LEFT FOOT: ICD-10-CM

## 2024-02-21 DIAGNOSIS — M47.817 FACET ARTHROPATHY, LUMBOSACRAL: ICD-10-CM

## 2024-02-21 DIAGNOSIS — G89.29 CHRONIC LEFT-SIDED LOW BACK PAIN WITH LEFT-SIDED SCIATICA: ICD-10-CM

## 2024-02-21 PROCEDURE — 1125F AMNT PAIN NOTED PAIN PRSNT: CPT | Performed by: GENERAL PRACTICE

## 2024-02-21 PROCEDURE — 3075F SYST BP GE 130 - 139MM HG: CPT | Performed by: GENERAL PRACTICE

## 2024-02-21 PROCEDURE — 1170F FXNL STATUS ASSESSED: CPT | Performed by: GENERAL PRACTICE

## 2024-02-21 PROCEDURE — 3079F DIAST BP 80-89 MM HG: CPT | Performed by: GENERAL PRACTICE

## 2024-02-21 PROCEDURE — 99213 OFFICE O/P EST LOW 20 MIN: CPT | Performed by: GENERAL PRACTICE

## 2024-02-21 ASSESSMENT — FIBROSIS 4 INDEX: FIB4 SCORE: 1.26

## 2024-02-21 ASSESSMENT — PAIN SCALES - GENERAL: PAINLEVEL: 3=SLIGHT PAIN

## 2024-02-21 ASSESSMENT — PATIENT HEALTH QUESTIONNAIRE - PHQ9: CLINICAL INTERPRETATION OF PHQ2 SCORE: 0

## 2024-02-21 NOTE — PROGRESS NOTES
Physiatry (Physical Medicine and  Rehabilitation)     Date of service: See epic    Chief complaint:   Chief Complaint   Patient presents with    Follow-Up     3m fv         Referring provider: Sonia Ramirez P.A.*    HISTORY    HPI:  Soledad Jordan is a 63 y.o. RHDd very pleasant female with past medical history of left leg pain and primary osteoarthritis of both hands who presents with a referral for left leg pain      Medical records review:  I reviewed the note from the referring provider Sonia Ramirez P.A.* including the note dated 9/1/2023.    Management plan 11/29/2023  -Medications/Modalities:   -Tylenol/acetaminophen 975mg (for example, 3 tabs of 325mg, or 2 tabs of 500mg) every 6-8 hours as needed for mild/moderate pain.  Do not take more than 3000mg of Tylenol in 24 hours.   NSAIDS for severe pain   -Ibuprofen 600mg (1 tab of 600mg-800mg) every 6-8 hours (take with food) as needed.  Do not take more than 2400 mg of ibuprofen in 24 hours. Take with milk or with food.  do not take with other NSAIDs such as naproxen, , Celebrex, meloxicam.  -You may also try ice packs or heating pads to help with pain for no more than 15 minutes at a time  -Limitations:   Activity as tolerated  -Brace/orthotic/assistive devices: Not indicated at this time  -Therapy (PT/OT/Aquatherapy): Continue to focus on strengthening and stretching  -Home exercise program: encouraged and demonstrated home exercises of regular strengthening and stretching and provided exercise program for plantar fasciitis  -Office Injections: will consider at a later time and will consider corticosteroid  Diagnostic workup: reviewed today as above  Interventional program: I would  consider the patient for an epidural steroid injection depending on the results of the above   Referrals: none     Prior Procedures:   11/8/2023 Ultrasound-Guided left greater trochanteric with no corticosteroid and 1 day relief.      HPI  2/21/2024 since last  visit/injection 1/8/23, patient has reported improvement for roughly 3 months.  Would like to hold on injection as patient had a recent death in the family.  However improvement with ambulation and performing activities of daily living.  In addition, reported that she still having chronic left lateral ankle pain.  She has a history of a prior ankle fracture but this is not completely inhibiting her ambulation.  No recent injury, falls, or trauma.  No associated symptoms:  No radiation of pain downBLE, incontinence, progressive weakness, saddle anesthesia, or coordination issues.  The patient  denies any fevers, chills, chest pain or shortness of breath.       11/29/2023 since last visit, patient has reported no significant improvement status post left greater trochanter injection.  No new medical testing.  No new physical therapy appointments.  Still can sit for 1 hour, walk for 45 minutes, stand for 3 to 4 hours.  Still sleeping for 7 hours.  No other medications.  Still stated pain is aching along gluteal.  Still burning and tingling sensations down the left lateral leg. Pain mostly at the end of day.  No recent injury or trauma.  No associated symptoms:  no radiation of pain down BLE, incontinence, progressive weakness, or saddle anesthesia.     Patient reported left plantar foot pain.  Sharp with palpation and worse in the mornings.    11/8/23 Subacute on chronic low back pain with referred into left buttock.  Painful sitting.  Current pain level 3/10 and started off as 9/10.  Reported numbness and tingling at night.  Currently engaged with physical therapy and effective for therapy notes..  Ibuprofen is effective for pain control.  Affecting activities of daily living such as flexion, squatting, standing tolerance, and housework tolerance.      In addition, reported left greater trochanter pain reproducible.  Difficulty lying on side.  Restricting physical therapy.  Pain also at Gerdy's tubercle. would like to  consider injection without corticosteroid.      ROS:   Red Flags ROS:   Fever, Chills, Sweats: Denies  Involuntary Weight Loss: Denies  Bladder Incontinence: Denies  Bowel Incontinence: denies  Saddle Anesthesia: Denies          GOALS OF TREATMENT: Symptom/Pain relief. improve function.        PMHx:   Past Medical History:   Diagnosis Date    Dyslipidemia     GERD (gastroesophageal reflux disease)     H/O mammogram 2013    normal    Pap smear for cervical cancer screening 2012    normal    PUD (peptic ulcer disease)        PSHx:   Past Surgical History:   Procedure Laterality Date    OTHER ORTHOPEDIC SURGERY  01/01/2009    left shoulder    OTHER ORTHOPEDIC SURGERY  01/01/2005    left knee    ABDOMINAL HYSTERECTOMY TOTAL      ovaries/uterus    APPENDECTOMY      CHOLECYSTECTOMY      OTHER ORTHOPEDIC SURGERY Left     bunion left foot       Family history   Family History   Problem Relation Age of Onset    Cancer Neg Hx     Ovarian Cancer Neg Hx     Tubal Cancer Neg Hx     Peritoneal Cancer Neg Hx     Colorectal Cancer Neg Hx     Breast Cancer Neg Hx     Bilateral Breast Cancer Neg Hx     Diabetes Neg Hx     Heart Disease Neg Hx     Hypertension Neg Hx        Medications: reviewed on epic.   Outpatient Medications Marked as Taking for the 2/21/24 encounter (Office Visit) with Abilio Slater D.O.   Medication Sig Dispense Refill    omeprazole (PRILOSEC) 20 MG delayed-release capsule TAKE 1 CAPSULE BY MOUTH ONCE DAILY 30 MINUTES BEFORE MORNING MEAL 90 Capsule 0    alendronate (FOSAMAX) 70 MG Tab Take 1 Tablet by mouth every 7 days. 12 Tablet 3    benzonatate (TESSALON) 100 MG Cap Take 1 Capsule by mouth 3 times a day as needed for Cough. 60 Capsule 0    ondansetron (ZOFRAN ODT) 4 MG TABLET DISPERSIBLE Take 1 Tablet by mouth every 6 hours as needed for Nausea/Vomiting for up to 15 doses. 15 Tablet 0    fluconazole (DIFLUCAN) 150 MG tablet Use 1 pill (one dose) as needed for symptoms of yeast infection. May repeat after 3  "days if needed. 5 Tablet 0    triamcinolone acetonide (KENALOG) 0.025 % Cream Apply  topically 1 time a day as needed.          Allergies:   Allergies   Allergen Reactions    Phenergan [Promethazine Hcl] Swelling     Of the FACE     Oxycodone-Acetaminophen Unspecified    Percocet [Oxycodone-Acetaminophen] Vomiting       Social Hx:   Social History     Socioeconomic History    Marital status:      Spouse name: Not on file    Number of children: Not on file    Years of education: Not on file    Highest education level: Not on file   Occupational History    Not on file   Tobacco Use    Smoking status: Never    Smokeless tobacco: Never   Vaping Use    Vaping Use: Never used   Substance and Sexual Activity    Alcohol use: No    Drug use: No    Sexual activity: Yes     Partners: Male   Other Topics Concern    Not on file   Social History Narrative    Not on file     Social Determinants of Health     Financial Resource Strain: Not on file   Food Insecurity: Not on file   Transportation Needs: Not on file   Physical Activity: Not on file   Stress: Not on file   Social Connections: Not on file   Intimate Partner Violence: Not on file   Housing Stability: Not on file         EXAMINATION   Vitals: /80 (BP Location: Left arm, Patient Position: Sitting, BP Cuff Size: Adult)   Pulse 68   Temp 36.4 °C (97.5 °F) (Temporal)   Ht 1.6 m (5' 3\")   Wt 59 kg (130 lb)   SpO2 97%   Physical Exam:     Body Habitus: Body mass index is 23.03 kg/m².  Appearance: Well-groomed, well-nourished, not disheveled  Eyes: No scleral icterus to suggest severe liver disease, no proptosis to suggest severe hyperthyroid  ENT -no obvious auditory deficits, no external lesions, moist mucus membranes   Skin -no rashes or lesions noted. No appreciable skin breakdown on exposed skin areas.    Respiratory-  breathing comfortably on room air, no audible wheezing, full sentences  Cardiovascular- No lower extremity edema noted.   Psychiatric- " alert and oriented,  calm, comfortable, cooperative     Musculoskeletal and Neuro:  Gait and station - normal gait with reciprocal pattern,  no use of ambulatory device, no arm assistance with sit-to-stand, nonantalgic. no loss of balance.  No change in patient's demeanor with exam.    Grossly normal cranial nerve exam and sensory exam grossly  Coordination grossly intact  Single leg balance / Trendelenburg:  normal in balance and control      palpation in hip or over the gluteus medius tendon insertion: negative right, positive left inferior side    Tenderness to palpation along IT band and at Gerdy's tubercle left   Left PSIS tenderness to palpation.     Lumbar spine Special tests  Neuro tension  Straight leg test negative bilaterally     HIP  FAIR test negative bilaterally   Range of motion in the RIGHT hip is full  in flexion, extension, abduction, internal rotation, external rotation.  Range of motion in the LEFT hip is full  in flexion, extension, abduction, internal rotation, external rotation.  Pedrito negative bilaterally  Hamstring tightness with forward flexion  Negative gaenslen test bilaterally    SI joint tests  Observation patient sits on one buttocks: negative  Thigh thrust test negative bilaterally   PEDRITO test negative bilaterally    Key points for the international standards for neurological classification of spinal cord injury (ISNCSCI) to light touch.   Dermatome R L   L2 2 2   L3 2 2   L4 2 2   L5 2 2   S1 2 2     Motor Exam Lower Extremities  ? Myotome R L   Hip flexion L2 5 5   Knee extension L3 5 5   Ankle dorsiflexion L4 5 5   Toe extension L5 5 5   Ankle plantarflexion S1 5 5   Hip Abduction  4+ 4+   Hip Adduction  4+ 4+     Reflexes  Clonus of the ankle negative bilaterally     Left ankle (compared w/ unaffected side)    No tenderness to palpation.  Negative anterior drawer test, posterior drawer test, midfoot twist, squeeze test.  Comparable to contralateral ankle.  Normal passive range of  "motion      MEDICAL DECISION MAKING    Medical records review: see under HPI section.     DATA    Labs:   Lab Results   Component Value Date/Time    SODIUM 136 02/09/2023 07:56 AM    POTASSIUM 4.2 02/09/2023 07:56 AM    CHLORIDE 101 02/09/2023 07:56 AM    CO2 27 02/09/2023 07:56 AM    ANION 8.0 02/09/2023 07:56 AM    GLUCOSE 102 (H) 02/09/2023 07:56 AM    BUN 11 02/09/2023 07:56 AM    CREATININE 0.64 02/09/2023 07:56 AM    CALCIUM 9.8 02/09/2023 07:56 AM    ASTSGOT 24 02/09/2023 07:56 AM    ALTSGPT 18 02/09/2023 07:56 AM    TBILIRUBIN 0.5 02/09/2023 07:56 AM    ALBUMIN 4.9 02/09/2023 07:56 AM    TOTPROTEIN 7.6 02/09/2023 07:56 AM    GLOBULIN 2.7 02/09/2023 07:56 AM    AGRATIO 1.8 02/09/2023 07:56 AM   ]    No results found for: \"PROTHROMBTM\", \"INR\"     Lab Results   Component Value Date/Time    WBC 6.1 08/16/2023 06:56 AM    RBC 4.30 08/16/2023 06:56 AM    HEMOGLOBIN 13.4 08/16/2023 06:56 AM    HEMATOCRIT 41.1 08/16/2023 06:56 AM    MCV 95.6 08/16/2023 06:56 AM    MCH 31.2 08/16/2023 06:56 AM    MCHC 32.6 08/16/2023 06:56 AM    MPV 10.3 08/16/2023 06:56 AM    NEUTSPOLYS 47.60 02/09/2023 07:56 AM    LYMPHOCYTES 43.50 (H) 02/09/2023 07:56 AM    MONOCYTES 7.20 02/09/2023 07:56 AM    EOSINOPHILS 1.00 02/09/2023 07:56 AM    BASOPHILS 0.50 02/09/2023 07:56 AM        Lab Results   Component Value Date/Time    HBA1C 5.8 (H) 08/16/2023 06:56 AM        Imaging:   I personally reviewed following images, these are my reads  Left hip x-ray 9/6/2023: Visible lumbar spondylosis.  L5-S1 facet arthropathy.  Subtle SI joint pathology bilaterally.  Osteoarthritis of the greater trochanters    IMAGING radiology reads. I reviewed the following radiology reads         9/1/2023 10:28 AM     HISTORY/REASON FOR EXAM:  Atraumatic Pelvic/Hip Pain; left SI joint/left hip/left leg pain.  LEFT hip pain for one month.     TECHNIQUE/EXAM DESCRIPTION AND NUMBER OF VIEWS:  2 views of the LEFT hip.     COMPARISON: None     FINDINGS:  Pelvis and " sacrum are intact  Visualized LEFT proximal femur is intact and normally located  Mild loss of joint space in both hips.  Calcified phleboliths in the pelvis.     IMPRESSION:     1.  No LEFT hip or pelvic fracture.  2.  Mild degenerative change of both hips.                                                                                                      Diagnosis   Visit Diagnoses     ICD-10-CM   1. Greater trochanteric bursitis of left hip  M70.62   2. It band syndrome, left  M76.32   3. Facet arthropathy, lumbosacral  M47.817   4. Hamstring tightness of both lower extremities  M62.9   5. Plantar fasciitis of left foot  M72.2   6. Spondylosis  M47.9   7. Dietary counseling  Z71.3   8. Chronic left-sided low back pain with left-sided sciatica  M54.42    G89.29   9. Exercise counseling  Z71.82   10. Prediabetes  R73.03   11. Grief  F43.21   12. Chronic pain of left ankle  M25.572    G89.29           ASSESSMENT AND PLAN:  Soledad Jordan 63 y.o. female  Left greater trochanter syndrome with improvement with prior injection without corticosteroids.  Will repeat when patient is ready to tolerate the side effects with or without corticosteroids.  Current pain levels 5 out of 10.  She is reporting improvement in ADLs and ambulation from the prior injection.  Still suspect contributing factor is IT band with tenderness to palpation at gerdy's tubercle.  Provided patient home exercise program at last visit  Left plantar fasciitis and left ankle pain: History of lateral ankle sprains and will provide home exercise program with printout and to perform ABCs with ankle.  No instability with exam  grief: PHQ9 of 0; Recent loss of family member and informed patient of counseling if interested  Prediabetes  Dietary and exercising counseling discussed.  Extensive discussion regarding treatment options, at this time recommending the following:    Orders Placed This Encounter    DX-FOOT-COMPLETE 3+ LEFT         PLAN  I did have an  extensive discussion regarding pathology and treatment options with the patient today including medications, injections, physical agents (eg. water, heat, cold, TENS), therapy-based programs (eg. massage, stretching/traction, exercise), alternative modalities, and lastly surgical intervention:  -Medications/Modalities:   -Tylenol/acetaminophen 975mg (for example, 3 tabs of 325mg, or 2 tabs of 500mg) every 6-8 hours as needed for mild/moderate pain.  Do not take more than 3000mg of Tylenol in 24 hours.   NSAIDS for severe pain   -Ibuprofen 600mg (1 tab of 600mg-800mg) every 6-8 hours (take with food) as needed.  Do not take more than 2400 mg of ibuprofen in 24 hours. Take with milk or with food.  do not take with other NSAIDs such as naproxen, , Celebrex, meloxicam.  -You may also try ice packs or heating pads to help with pain for no more than 15 minutes at a time  -Limitations:   Activity as tolerated  -Brace/orthotic/assistive devices: Not indicated at this time  -Therapy (PT/OT/Aquatherapy): Continue to focus on strengthening and stretching  -Home exercise program: encouraged and demonstrated home exercises of regular strengthening and stretching and provided exercise program for plantar fasciitis, single-leg balance, and ankle strengthening  -Office Injections: will consider at a later time and will consider corticosteroid if interested  Diagnostic workup: reviewed today as above  Interventional program: I would  consider the patient for an epidural steroid injection depending on the results of the above   Referrals: none     Follow-up:    Return to clinic in 12 weeks for follow-up of symptomatology, or sooner as needed for any acute issues.  Patient expressed understanding of the management plan. Patient (and Family Members) was/were encouraged to call if any worries, issues, problems or concerns prior to the next visit     Please note that this dictation was created using voice recognition software. I have  made every reasonable attempt to correct obvious errors but there may be errors of grammar and content that I may have overlooked prior to finalization of this note.    My total time spent caring for the patient on the day of the encounter was 23 minutes.   This does not include time spent on separately billable procedures/tests.      Abilio Slater, DO  Physical Medicine and Rehabilitation  [facility]         DMITRI Ramirez P.A.-C.   Sonia Bailey P.A.*

## 2024-02-21 NOTE — Clinical Note
Ms Sonia Jordan was evaluated today for left greater trochanter syndrome and left ankle pain.  During discussion, she mentioned that she had a recent death in the family.  She seems to be fine with PHQ 0.  Just want to inform you  Please contact if further questions.     Best Regards,   Abilio Slater, DO   Physical Medicine and Rehabilitation

## 2024-03-21 ENCOUNTER — HOSPITAL ENCOUNTER (OUTPATIENT)
Dept: RADIOLOGY | Facility: MEDICAL CENTER | Age: 64
End: 2024-03-21
Attending: GENERAL PRACTICE
Payer: MEDICARE

## 2024-03-21 ENCOUNTER — HOSPITAL ENCOUNTER (OUTPATIENT)
Dept: LAB | Facility: MEDICAL CENTER | Age: 64
End: 2024-03-21
Attending: PHYSICIAN ASSISTANT
Payer: MEDICARE

## 2024-03-21 DIAGNOSIS — M25.572 CHRONIC PAIN OF LEFT ANKLE: ICD-10-CM

## 2024-03-21 DIAGNOSIS — E78.5 DYSLIPIDEMIA: ICD-10-CM

## 2024-03-21 DIAGNOSIS — G89.29 CHRONIC PAIN OF LEFT ANKLE: ICD-10-CM

## 2024-03-21 DIAGNOSIS — D72.819 LEUKOPENIA, UNSPECIFIED TYPE: ICD-10-CM

## 2024-03-21 DIAGNOSIS — R53.83 OTHER FATIGUE: ICD-10-CM

## 2024-03-21 LAB
ALBUMIN SERPL BCP-MCNC: 4.7 G/DL (ref 3.2–4.9)
ALBUMIN/GLOB SERPL: 1.7 G/DL
ALP SERPL-CCNC: 49 U/L (ref 30–99)
ALT SERPL-CCNC: 14 U/L (ref 2–50)
ANION GAP SERPL CALC-SCNC: 13 MMOL/L (ref 7–16)
AST SERPL-CCNC: 25 U/L (ref 12–45)
BASOPHILS # BLD AUTO: 0.5 % (ref 0–1.8)
BASOPHILS # BLD: 0.02 K/UL (ref 0–0.12)
BILIRUB SERPL-MCNC: 0.4 MG/DL (ref 0.1–1.5)
BUN SERPL-MCNC: 10 MG/DL (ref 8–22)
CALCIUM ALBUM COR SERPL-MCNC: 8.7 MG/DL (ref 8.5–10.5)
CALCIUM SERPL-MCNC: 9.3 MG/DL (ref 8.5–10.5)
CHLORIDE SERPL-SCNC: 103 MMOL/L (ref 96–112)
CHOLEST SERPL-MCNC: 208 MG/DL (ref 100–199)
CO2 SERPL-SCNC: 21 MMOL/L (ref 20–33)
CREAT SERPL-MCNC: 0.7 MG/DL (ref 0.5–1.4)
EOSINOPHIL # BLD AUTO: 0.06 K/UL (ref 0–0.51)
EOSINOPHIL NFR BLD: 1.5 % (ref 0–6.9)
ERYTHROCYTE [DISTWIDTH] IN BLOOD BY AUTOMATED COUNT: 44.1 FL (ref 35.9–50)
EST. AVERAGE GLUCOSE BLD GHB EST-MCNC: 111 MG/DL
FASTING STATUS PATIENT QL REPORTED: NORMAL
GLOBULIN SER CALC-MCNC: 2.7 G/DL (ref 1.9–3.5)
GLUCOSE SERPL-MCNC: 96 MG/DL (ref 65–99)
HBA1C MFR BLD: 5.5 % (ref 4–5.6)
HCT VFR BLD AUTO: 41.8 % (ref 37–47)
HDLC SERPL-MCNC: 62 MG/DL
HGB BLD-MCNC: 13.7 G/DL (ref 12–16)
IMM GRANULOCYTES # BLD AUTO: 0.01 K/UL (ref 0–0.11)
IMM GRANULOCYTES NFR BLD AUTO: 0.3 % (ref 0–0.9)
LDLC SERPL CALC-MCNC: 130 MG/DL
LYMPHOCYTES # BLD AUTO: 1.74 K/UL (ref 1–4.8)
LYMPHOCYTES NFR BLD: 44.3 % (ref 22–41)
MCH RBC QN AUTO: 32 PG (ref 27–33)
MCHC RBC AUTO-ENTMCNC: 32.8 G/DL (ref 32.2–35.5)
MCV RBC AUTO: 97.7 FL (ref 81.4–97.8)
MONOCYTES # BLD AUTO: 0.32 K/UL (ref 0–0.85)
MONOCYTES NFR BLD AUTO: 8.1 % (ref 0–13.4)
NEUTROPHILS # BLD AUTO: 1.78 K/UL (ref 1.82–7.42)
NEUTROPHILS NFR BLD: 45.3 % (ref 44–72)
NRBC # BLD AUTO: 0 K/UL
NRBC BLD-RTO: 0 /100 WBC (ref 0–0.2)
PLATELET # BLD AUTO: 289 K/UL (ref 164–446)
PMV BLD AUTO: 9.9 FL (ref 9–12.9)
POTASSIUM SERPL-SCNC: 4.7 MMOL/L (ref 3.6–5.5)
PROT SERPL-MCNC: 7.4 G/DL (ref 6–8.2)
RBC # BLD AUTO: 4.28 M/UL (ref 4.2–5.4)
SODIUM SERPL-SCNC: 137 MMOL/L (ref 135–145)
TRIGL SERPL-MCNC: 82 MG/DL (ref 0–149)
TSH SERPL DL<=0.005 MIU/L-ACNC: 1.97 UIU/ML (ref 0.38–5.33)
WBC # BLD AUTO: 3.9 K/UL (ref 4.8–10.8)

## 2024-03-21 PROCEDURE — 83036 HEMOGLOBIN GLYCOSYLATED A1C: CPT

## 2024-03-21 PROCEDURE — 80053 COMPREHEN METABOLIC PANEL: CPT

## 2024-03-21 PROCEDURE — 85025 COMPLETE CBC W/AUTO DIFF WBC: CPT

## 2024-03-21 PROCEDURE — 84443 ASSAY THYROID STIM HORMONE: CPT

## 2024-03-21 PROCEDURE — 80061 LIPID PANEL: CPT

## 2024-03-21 PROCEDURE — 36415 COLL VENOUS BLD VENIPUNCTURE: CPT

## 2024-03-21 PROCEDURE — 73630 X-RAY EXAM OF FOOT: CPT | Mod: LT

## 2024-03-22 LAB — GFR SERPLBLD CREATININE-BSD FMLA CKD-EPI: 95 ML/MIN/1.73 M 2

## 2024-03-22 NOTE — RESULT ENCOUNTER NOTE
Dear Soledad Jordan      I reviewed the results of your test.    I would like for you to schedule an earlier appointment to discuss the results in detail.     Abilio Slater, DO

## 2024-04-10 ENCOUNTER — APPOINTMENT (OUTPATIENT)
Dept: MEDICAL GROUP | Facility: PHYSICIAN GROUP | Age: 64
End: 2024-04-10
Payer: MEDICARE

## 2024-04-10 VITALS
TEMPERATURE: 97.6 F | WEIGHT: 135 LBS | OXYGEN SATURATION: 98 % | DIASTOLIC BLOOD PRESSURE: 58 MMHG | HEIGHT: 62 IN | BODY MASS INDEX: 24.84 KG/M2 | SYSTOLIC BLOOD PRESSURE: 116 MMHG | HEART RATE: 62 BPM | RESPIRATION RATE: 16 BRPM

## 2024-04-10 DIAGNOSIS — T46.6X5A MYALGIA DUE TO STATIN: ICD-10-CM

## 2024-04-10 DIAGNOSIS — M81.0 AGE-RELATED OSTEOPOROSIS WITHOUT CURRENT PATHOLOGICAL FRACTURE: ICD-10-CM

## 2024-04-10 DIAGNOSIS — F43.21 GRIEF: ICD-10-CM

## 2024-04-10 DIAGNOSIS — Z12.11 COLON CANCER SCREENING: ICD-10-CM

## 2024-04-10 DIAGNOSIS — E78.5 DYSLIPIDEMIA: ICD-10-CM

## 2024-04-10 DIAGNOSIS — K21.9 GASTROESOPHAGEAL REFLUX DISEASE WITHOUT ESOPHAGITIS: ICD-10-CM

## 2024-04-10 DIAGNOSIS — M25.50 ARTHRALGIA, UNSPECIFIED JOINT: ICD-10-CM

## 2024-04-10 DIAGNOSIS — Z11.59 NEED FOR HEPATITIS C SCREENING TEST: ICD-10-CM

## 2024-04-10 DIAGNOSIS — D72.819 LEUKOPENIA, UNSPECIFIED TYPE: ICD-10-CM

## 2024-04-10 DIAGNOSIS — M79.10 MYALGIA DUE TO STATIN: ICD-10-CM

## 2024-04-10 PROCEDURE — 3074F SYST BP LT 130 MM HG: CPT | Performed by: PHYSICIAN ASSISTANT

## 2024-04-10 PROCEDURE — 3078F DIAST BP <80 MM HG: CPT | Performed by: PHYSICIAN ASSISTANT

## 2024-04-10 PROCEDURE — 99214 OFFICE O/P EST MOD 30 MIN: CPT | Performed by: PHYSICIAN ASSISTANT

## 2024-04-10 PROCEDURE — 1170F FXNL STATUS ASSESSED: CPT | Performed by: PHYSICIAN ASSISTANT

## 2024-04-10 RX ORDER — ALENDRONATE SODIUM 70 MG/1
70 TABLET ORAL
Qty: 12 TABLET | Refills: 3 | Status: SHIPPED | OUTPATIENT
Start: 2024-04-10

## 2024-04-10 RX ORDER — OMEPRAZOLE 20 MG/1
CAPSULE, DELAYED RELEASE ORAL
Qty: 90 CAPSULE | Refills: 3 | Status: SHIPPED | OUTPATIENT
Start: 2024-04-10

## 2024-04-10 ASSESSMENT — ENCOUNTER SYMPTOMS
FEVER: 0
SHORTNESS OF BREATH: 0
CHILLS: 0

## 2024-04-10 ASSESSMENT — FIBROSIS 4 INDEX: FIB4 SCORE: 1.53

## 2024-04-10 NOTE — ASSESSMENT & PLAN NOTE
Chronic, improving.  Still has her sad days but overall is improving over the last 2 months, since her mother's passing 2/11/2024.

## 2024-04-10 NOTE — ASSESSMENT & PLAN NOTE
Chronic, uncontrolled.  Started March 2024.  Tolerating/continue ibuprofen and acetaminophen as needed and as directed.  Ibuprofen for the left hip, seems to help the other joints a little.  Acetaminophen seems to help a little as well.  Voltaren gel and aspercreme seem to help as well.  Follow up for worsening symptoms.

## 2024-04-10 NOTE — PROGRESS NOTES
SUBJECTIVE:     CC: follow up labs     HPI:   Soledad presents today with the following:    ASSESSMENT & PLAN by Problem:       Problem List Items Addressed This Visit       Age-related osteoporosis without current pathological fracture     Chronic, controlled.  Tolerating/continue fosamax 70mg daily.         Relevant Medications    alendronate (FOSAMAX) 70 MG Tab    Dyslipidemia     Chronic, uncontrolled.  History of statin induced myalgias.  Increase plant-based foods, decrease animal-based foods.              GERD (gastroesophageal reflux disease)     Chronic, stable.  Tolerating/continue omeprazole 20mg daily.         Relevant Medications    omeprazole (PRILOSEC) 20 MG delayed-release capsule    Grief     Chronic, improving.  Still has her sad days but overall is improving over the last 2 months, since her mother's passing 2024.         Joint pain     Chronic, uncontrolled.  Started 2024.  Tolerating/continue ibuprofen and acetaminophen as needed and as directed.  Ibuprofen for the left hip, seems to help the other joints a little.  Acetaminophen seems to help a little as well.  Voltaren gel and aspercreme seem to help as well.  Follow up for worsening symptoms.         Leukopenia     Chronic, uncontrolled.  WBC count has been fluctuating.  Repeat labs in 6 months.         Relevant Orders    CBC WITH DIFFERENTIAL    Myalgia due to statin     History of statin induced myalgias.          Other Visit Diagnoses       Colon cancer screening        Relevant Orders    Referral to GI for Colonoscopy    Need for hepatitis C screening test        Relevant Orders    HCV Scrn ( [id number] [employer])            WBC has been fluctuating.  Repeat labs in 6 months.    Return in about 6 months (around 10/10/2024) for lab discussion.        Healthcare Maintenance:      HPI:     Problem   Myalgia Due to Statin    History of statin induced myalgias.     Joint Pain    Chronic, uncontrolled.  Started 2024.  Both ankles,  right hand/wrist/elbow.  Pain with use of right hand.  Pain most days.  NTTP; denies numb/tingling.  Ibuprofen for the left hip, seems to help the other joints a little.  Acetaminophen seems to help a little as well.     Grief    Chronic, improving.  Patient's mother passed away 2/11/2024 in [place].  Patient was able to see her 3 days prior to her passing on video and she seemed to be doing okay.  Patient says she has her sad days where she cries but overall she is feeling a little bit better.     Dyslipidemia    Chronic, uncontrolled.    Latest Labs:   Lab Results   Component Value Date/Time    CHOLSTRLTOT 208 (H) 03/21/2024 06:58 AM     (H) 03/21/2024 06:58 AM    HDL 62 03/21/2024 06:58 AM    TRIGLYCERIDE 82 03/21/2024 06:58 AM      Medications: declines medication for now; history of statin intolerance (muscle pain); can't remember the name.    Diet/Exercise: Discussed increasing plant-based foods and decreasing animal-based and processed foods.  Risk calculator: The 10-year ASCVD risk score (João REY, et al., 2019) is: 7%        Leukopenia    Chronic, uncontrolled.  WBC: 3.9 (3/2024); 4.2 92/2023)     Age-Related Osteoporosis Without Current Pathological Fracture    Chronic, uncontrolled.  Tolerating/continue fosamax 70mg daily.    DEXA, 1/2023:  L spine T score -3.0  Left femur T score -0.7     Starting Fosamax 3/1/2023.         Gerd (Gastroesophageal Reflux Disease)    Chronic, stable.  Tolerating/continue omeprazole 20mg daily.  Takes it daily.  Started around 2015.  Started by GI doctor after EKD.  Famotidine did NOT help with the symptoms.                  ROS:  Review of Systems   Constitutional:  Negative for chills and fever.   Respiratory:  Negative for shortness of breath.    Cardiovascular:  Negative for chest pain.       OBJECTIVE:     Exam:  /58 (BP Location: Left arm, Patient Position: Sitting, BP Cuff Size: Adult)   Pulse 62   Temp 36.4 °C (97.6 °F) (Temporal)   Resp 16   Ht  "1.575 m (5' 2\")   Wt 61.2 kg (135 lb)   SpO2 98%   BMI 24.69 kg/m²  Body mass index is 24.69 kg/m².    Physical Exam  Vitals reviewed.   Constitutional:       General: She is not in acute distress.     Appearance: Normal appearance.   Pulmonary:      Effort: Pulmonary effort is normal.   Neurological:      General: No focal deficit present.      Mental Status: She is alert.   Psychiatric:         Mood and Affect: Mood normal.         Behavior: Behavior normal.         Judgment: Judgment normal.           CHART REVIEW:     Labs:                      Please note that this dictation was created using voice recognition software. I have made every reasonable attempt to correct obvious errors, but I expect that there are errors of grammar and possibly content that I did not discover before finalizing the note.    "

## 2024-04-10 NOTE — ASSESSMENT & PLAN NOTE
Chronic, uncontrolled.  History of statin induced myalgias.  Increase plant-based foods, decrease animal-based foods.

## 2024-04-15 ENCOUNTER — TELEPHONE (OUTPATIENT)
Dept: HEALTH INFORMATION MANAGEMENT | Facility: OTHER | Age: 64
End: 2024-04-15
Payer: MEDICARE

## 2024-05-08 ENCOUNTER — TELEPHONE (OUTPATIENT)
Dept: FAMILY PLANNING/WOMEN'S HEALTH CLINIC | Facility: PHYSICIAN GROUP | Age: 64
End: 2024-05-08
Payer: MEDICARE

## 2024-05-08 NOTE — TELEPHONE ENCOUNTER
Confirmed appointment with patient for the [facility] apt on 5/9 at 9:45. Provided patient [facility] [address] [place] [address] and provided stores in the area to help with navigation to clinic.

## 2024-05-09 ENCOUNTER — OFFICE VISIT (OUTPATIENT)
Dept: FAMILY PLANNING/WOMEN'S HEALTH CLINIC | Facility: PHYSICIAN GROUP | Age: 64
End: 2024-05-09
Payer: MEDICARE

## 2024-05-09 VITALS
HEIGHT: 62 IN | BODY MASS INDEX: 24.84 KG/M2 | SYSTOLIC BLOOD PRESSURE: 122 MMHG | WEIGHT: 135 LBS | DIASTOLIC BLOOD PRESSURE: 78 MMHG

## 2024-05-09 DIAGNOSIS — M79.10 MYALGIA DUE TO STATIN: ICD-10-CM

## 2024-05-09 DIAGNOSIS — K21.9 GASTROESOPHAGEAL REFLUX DISEASE WITHOUT ESOPHAGITIS: ICD-10-CM

## 2024-05-09 DIAGNOSIS — F43.21 GRIEF: ICD-10-CM

## 2024-05-09 DIAGNOSIS — T46.6X5A MYALGIA DUE TO STATIN: ICD-10-CM

## 2024-05-09 DIAGNOSIS — M81.0 AGE-RELATED OSTEOPOROSIS WITHOUT CURRENT PATHOLOGICAL FRACTURE: ICD-10-CM

## 2024-05-09 DIAGNOSIS — D72.819 LEUKOPENIA, UNSPECIFIED TYPE: ICD-10-CM

## 2024-05-09 DIAGNOSIS — E78.5 DYSLIPIDEMIA: ICD-10-CM

## 2024-05-09 PROCEDURE — 1170F FXNL STATUS ASSESSED: CPT

## 2024-05-09 PROCEDURE — G0439 PPPS, SUBSEQ VISIT: HCPCS

## 2024-05-09 PROCEDURE — 3074F SYST BP LT 130 MM HG: CPT

## 2024-05-09 PROCEDURE — 1125F AMNT PAIN NOTED PAIN PRSNT: CPT

## 2024-05-09 PROCEDURE — 3078F DIAST BP <80 MM HG: CPT

## 2024-05-09 SDOH — ECONOMIC STABILITY: FOOD INSECURITY: WITHIN THE PAST 12 MONTHS, YOU WORRIED THAT YOUR FOOD WOULD RUN OUT BEFORE YOU GOT MONEY TO BUY MORE.: NEVER TRUE

## 2024-05-09 SDOH — ECONOMIC STABILITY: TRANSPORTATION INSECURITY
IN THE PAST 12 MONTHS, HAS LACK OF TRANSPORTATION KEPT YOU FROM MEETINGS, WORK, OR FROM GETTING THINGS NEEDED FOR DAILY LIVING?: NO

## 2024-05-09 SDOH — ECONOMIC STABILITY: INCOME INSECURITY: HOW HARD IS IT FOR YOU TO PAY FOR THE VERY BASICS LIKE FOOD, HOUSING, MEDICAL CARE, AND HEATING?: NOT VERY HARD

## 2024-05-09 SDOH — ECONOMIC STABILITY: FOOD INSECURITY: WITHIN THE PAST 12 MONTHS, THE FOOD YOU BOUGHT JUST DIDN'T LAST AND YOU DIDN'T HAVE MONEY TO GET MORE.: NEVER TRUE

## 2024-05-09 SDOH — ECONOMIC STABILITY: HOUSING INSECURITY
IN THE LAST 12 MONTHS, WAS THERE A TIME WHEN YOU DID NOT HAVE A STEADY PLACE TO SLEEP OR SLEPT IN A SHELTER (INCLUDING NOW)?: NO

## 2024-05-09 SDOH — ECONOMIC STABILITY: INCOME INSECURITY: IN THE LAST 12 MONTHS, WAS THERE A TIME WHEN YOU WERE NOT ABLE TO PAY THE MORTGAGE OR RENT ON TIME?: NO

## 2024-05-09 SDOH — ECONOMIC STABILITY: HOUSING INSECURITY: IN THE LAST 12 MONTHS, HOW MANY PLACES HAVE YOU LIVED?: 1

## 2024-05-09 SDOH — ECONOMIC STABILITY: TRANSPORTATION INSECURITY
IN THE PAST 12 MONTHS, HAS THE LACK OF TRANSPORTATION KEPT YOU FROM MEDICAL APPOINTMENTS OR FROM GETTING MEDICATIONS?: NO

## 2024-05-09 ASSESSMENT — FIBROSIS 4 INDEX: FIB4 SCORE: 1.53

## 2024-05-09 ASSESSMENT — PAIN SCALES - GENERAL: PAINLEVEL: 2=MINIMAL-SLIGHT

## 2024-05-09 ASSESSMENT — PATIENT HEALTH QUESTIONNAIRE - PHQ9: CLINICAL INTERPRETATION OF PHQ2 SCORE: 0

## 2024-05-09 ASSESSMENT — ACTIVITIES OF DAILY LIVING (ADL): BATHING_REQUIRES_ASSISTANCE: 0

## 2024-05-09 ASSESSMENT — ENCOUNTER SYMPTOMS: GENERAL WELL-BEING: FAIR

## 2024-05-09 NOTE — PROGRESS NOTES
Comprehensive Health Assessment Program     Soledad Jordan is a 64 y.o. here for her comprehensive health assessment.    Patient Active Problem List    Diagnosis Date Noted    Myalgia due to statin 04/10/2024    Joint pain 04/10/2024    Grief 04/10/2024    Left leg pain 09/01/2023    Vaginal irritation 09/01/2023    Dyslipidemia 03/01/2023    Leukopenia 03/01/2023    Age-related osteoporosis without current pathological fracture 01/30/2023    Postmenopausal 01/26/2023    Fall 01/26/2023    Vaginal dryness 01/26/2023    GERD (gastroesophageal reflux disease) 11/14/2022    IGT (impaired glucose tolerance) 11/14/2022    BMI 24.0-24.9, adult 11/14/2022    Primary osteoarthritis of both hands 11/14/2022       Current Outpatient Medications   Medication Sig Dispense Refill    NIACIN ER PO Take  by mouth.      alendronate (FOSAMAX) 70 MG Tab Take 1 Tablet by mouth every 7 days. 12 Tablet 3    omeprazole (PRILOSEC) 20 MG delayed-release capsule TAKE 1 CAPSULE BY MOUTH ONCE DAILY 30 MINUTES BEFORE MORNING MEAL 90 Capsule 3    ondansetron (ZOFRAN ODT) 4 MG TABLET DISPERSIBLE Take 1 Tablet by mouth every 6 hours as needed for Nausea/Vomiting for up to 15 doses. 15 Tablet 0    fluconazole (DIFLUCAN) 150 MG tablet Use 1 pill (one dose) as needed for symptoms of yeast infection. May repeat after 3 days if needed. 5 Tablet 0    triamcinolone acetonide (KENALOG) 0.025 % Cream Apply  topically 1 time a day as needed.       No current facility-administered medications for this visit.          Current supplements as per medication list.     Allergies:   Phenergan [promethazine hcl], Oxycodone-acetaminophen, and Percocet [oxycodone-acetaminophen]  Social History     Tobacco Use    Smoking status: Never    Smokeless tobacco: Never   Vaping Use    Vaping Use: Never used   Substance Use Topics    Alcohol use: No    Drug use: No     Family History   Problem Relation Age of Onset    Cancer Neg Hx     Ovarian Cancer Neg Hx     Tubal  Cancer Neg Hx     Peritoneal Cancer Neg Hx     Colorectal Cancer Neg Hx     Breast Cancer Neg Hx     Bilateral Breast Cancer Neg Hx     Diabetes Neg Hx     Heart Disease Neg Hx     Hypertension Neg Hx      Soledad  has a past medical history of Dyslipidemia, GERD (gastroesophageal reflux disease), H/O mammogram (2013), Pap smear for cervical cancer screening (2012), and PUD (peptic ulcer disease).   Past Surgical History:   Procedure Laterality Date    OTHER ORTHOPEDIC SURGERY  01/01/2009    left shoulder    OTHER ORTHOPEDIC SURGERY  01/01/2005    left knee    ABDOMINAL HYSTERECTOMY TOTAL      ovaries/uterus    APPENDECTOMY      CHOLECYSTECTOMY      OTHER ORTHOPEDIC SURGERY Left     bunion left foot       Screening:  In the last six months have you experienced any leakage of urine? No    Depression Screening  Little interest or pleasure in doing things?  0 - not at all  Feeling down, depressed , or hopeless? 0 - not at all  Patient Health Questionnaire Score: 0     If depressive symptoms identified deferred to follow up visit unless specifically addressed in assessment and plan.    Interpretation of PHQ-9 Total Score   Score Severity   1-4 No Depression   5-9 Mild Depression   10-14 Moderate Depression   15-19 Moderately Severe Depression   20-27 Severe Depression    Screening for Cognitive Impairment  Do you or any of your friends or family members have any concern about your memory? No  Three Minute Recall (Leader, Season, Table) 3/3    Fernando clock face with all 12 numbers and set the hands to show 10 minutes after 11.  Yes 5/5  Cognitive concerns identified deferred for follow up unless specifically addressed in assessment and plan.    Fall Risk Assessment  Has the patient had two or more falls in the last year or any fall with injury in the last year?  No    Safety Assessment  Do you always wear your seatbelt?  Yes  Any changes to home needed to function safely? No  Difficulty hearing.  No  Patient counseled about  all safety risks that were identified.    Functional Assessment ADLs  Are there any barriers preventing you from cooking for yourself or meeting nutritional needs?  No.    Are there any barriers preventing you from driving safely or obtaining transportation?  No.    Are there any barriers preventing you from using a telephone or calling for help?  No    Are there any barriers preventing you from shopping?  No.    Are there any barriers preventing you from taking care of your own finances?  No    Are there any barriers preventing you from managing your medications?  No    Are there any barriers preventing you from showering, bathing or dressing yourself? No    Are there any barriers preventing you from doing housework or laundry? No  Are there any barriers preventing you from using the toilet?No  Are you currently engaging in any exercise or physical activity?  Yes. walking    Self-Assessment of Health  What is your perception of your health? Fair  Do you sleep more than six hours a night? Yes  In the past 7 days, how much did pain keep you from doing your normal work? None  Do you spend quality time with family or friends (virtually or in person)? Yes  Do you usually eat a heart healthy diet that constists of a variety of fruits, vegetables, whole grains and fiber? Yes  Do you eat foods high in fat and/or Fast Food more than three times per week? No    Advance Care Planning  Do you have an Advance Directive, Living Will, Durable Power of , or POLST? Yes          is on file      Health Maintenance Summary            Ordered - Hepatitis C Screening (Once) Ordered on 4/10/2024      No completion history exists for this topic.              Ordered - Colorectal Cancer Screening (Colon Cancer Screening Annual FIT - Yearly) Ordered on 4/10/2024      02/18/2014  POC Fecal Occult Blood component of POCT STOOL              Annual Wellness Visit (Yearly) Next due on 5/9/2025 05/09/2024  Level of Service: MARY  ANNUAL WELLNESS VISIT-INCLUDES PPPS SUBSEQUE*    10/11/2023  Visit Dx: Encounter for Medicare annual wellness exam    10/11/2023  Level of Service: IA INITIAL ANNUAL WELLNESS VISIT-INCLUDES PPPS    09/21/2023  Level of Service: IA ANNUAL WELLNESS VISIT-INCLUDES PPPS SUBSEQUE*              Mammogram (Every 2 Years) Next due on 1/17/2026 01/17/2024  MA-SCREENING MAMMO BILAT W/TOMOSYNTHESIS W/CAD    07/06/2022  MA-DIAGNOSTIC MAMMO BILAT W/TOMOSYNTHESIS W/CAD    06/25/2021  MA-DIAGNOSTIC MAMMO LEFT W/TOMOSYNTHESIS W/O CAD    12/09/2020  MA-SCREENING MAMMO BILAT W/TOMOSYNTHESIS W/O CAD    10/02/2017  MA-SCREEN MAMMO W/CAD-BILAT    Only the first 5 history entries have been loaded, but more history exists.              Bone Density Scan (Every 5 Years) Next due on 1/27/2028 01/27/2023  DS-BONE DENSITY STUDY (DEXA)              IMM DTaP/Tdap/Td Vaccine (2 - Td or Tdap) Next due on 1/26/2033 01/26/2023  Imm Admin: Tdap Vaccine              Zoster (Shingles) Vaccines (Series Information) Completed      09/17/2021  Imm Admin: Zoster Vaccine Recombinant (RZV) (SHINGRIX)    06/11/2021  Imm Admin: Zoster Vaccine Recombinant (RZV) (SHINGRIX)              Pneumococcal Vaccine: 65+ Years (Series Information) Completed      03/18/2023  Imm Admin: Pneumococcal Conjugate Vaccine (PCV20)              Influenza Vaccine (Series Information) Completed      10/11/2023  Imm Admin: Influenza Vaccine Adult HD    11/01/2022  Imm Admin: Influenza Vaccine Quad Inj (Pf)    10/02/2020  Imm Admin: Influenza Vaccine Quad Recombinant    10/21/2019  Imm Admin: Influenza Vaccine Quad Recombinant    10/19/2018  Imm Admin: Influenza Vaccine Quad Inj (Pf)    Only the first 5 history entries have been loaded, but more history exists.              Hepatitis A Vaccine (Hep A) (Series Information) Aged Out      No completion history exists for this topic.              Hepatitis B Vaccine (Hep B) (Series Information) Aged Out      No completion  "history exists for this topic.              HPV Vaccines (Series Information) Aged Out      No completion history exists for this topic.              Polio Vaccine (Inactivated Polio) (Series Information) Aged Out      No completion history exists for this topic.              Meningococcal Immunization (Series Information) Aged Out      No completion history exists for this topic.              Discontinued - COVID-19 Vaccine  Discontinued      05/05/2022  Imm Admin: MODERNA SARS-COV-2 VACCINE (12+)    12/02/2021  Imm Admin: MODERNA SARS-COV-2 VACCINE (12+)    04/29/2021  Imm Admin: MODERNA SARS-COV-2 VACCINE (12+)    04/01/2021  Imm Admin: MODERNA SARS-COV-2 VACCINE (12+)                    Patient Care Team:  Sonia Ramirez P.A.-C. as PCP - General (Physician Assistant)  Sonia Ramirez P.A.-C. as PCP - [facility] Paneled (Physician Assistant)  Gerson Cantu, PT, DPT as Physical Therapist (Physical Therapy)      Financial Resource Strain: Low Risk  (5/9/2024)    Overall Financial Resource Strain (CARDIA)     Difficulty of Paying Living Expenses: Not very hard      Transportation Needs: No Transportation Needs (5/9/2024)    PRAPARE - Transportation     Lack of Transportation (Medical): No     Lack of Transportation (Non-Medical): No      Food Insecurity: No Food Insecurity (5/9/2024)    Hunger Vital Sign     Worried About Running Out of Food in the Last Year: Never true     Ran Out of Food in the Last Year: Never true        Encounter Vitals  Blood Pressure : 122/78  O2 Delivery Device: None - Room Air  Weight: 61.2 kg (135 lb)  Height: 157.5 cm (5' 2\")  BMI (Calculated): 24.69  Pain Score: 2=Minimal-Slight  DME  O2 Delivery Device: None - Room Air     Alert, oriented in no acute distress.  Eye contact is good, speech goal directed, affect calm.    Assessment and Plan. The following treatment and monitoring plan is recommended:  Age-related osteoporosis without current pathological fracture  Chronic, stable. " The patient denies any recent falls of fractures. Discussed adding weight bearing exercise such as walking for 30 minutes most of the days of the week.  Also discussed adequate calcium and vitamin D ingestion and optimal diet.  Chronic, stable. Continue with current defined treatment plan: alendronate (FOSAMAX) 70 MG Tab . Follow-up at least annually.         BMI 24.0-24.9, adult  Chronic, stable. The patient is trying to stay active at home. She walks for 1 hour every day. The patient reports a heart healthy diet with plenty of fruits vegetables and lean protein.  Follow-up at least annually.      Dyslipidemia  Chronic, stable. The patient was not able to tolerate statins. She is currently taking OTC niacin. Continue with heart healthy diet and regular exercise.  Follow-up at least annually.      GERD (gastroesophageal reflux disease)  Chronic, stable. The patient reports that her symptoms are well controlled with current medication. Continue with current defined treatment plan: Omeprazole  (PRILOSEC PO) . Follow-up at least annually.      Leukopenia  Chronic, stable. The patient denies any signs or symptoms of infection. Continue with current defined treatment plan: surveillance. Follow-up at least annually.      Myalgia due to statin  Chronic, stable. The patient reports that she had to stop statins in the past due to muscle pain. Follow-up at least annually.      Grief  Chronic, stable. The patient reports that she recently lost her mother. She is trying to cope. She has good family support. She declines counseling at this time.  Follow-up at least annually.      Services suggested: No services needed at this time  Health Care Screening: Age-appropriate preventive services recommended by USPTF and ACIP covered by Medicare were discussed today. Services ordered if indicated and agreed upon by the patient.  Referrals offered: Community-based lifestyle interventions to reduce health risks and promote  self-management and wellness, fall prevention, nutrition, physical activity, tobacco-use cessation, weight loss, and mental health services as per orders if indicated.    Discussion today about general wellness and lifestyle habits:    Prevent falls and reduce trip hazards; Cautioned about securing or removing rugs.  Have a working fire alarm and carbon monoxide detector.  Engage in regular physical activity and social activities.    Follow-up: Return for appointment with Primary Care Provider as needed.

## 2024-05-09 NOTE — ASSESSMENT & PLAN NOTE
Chronic, stable. The patient reports that she had to stop statins in the past due to muscle pain. Follow-up at least annually.

## 2024-05-09 NOTE — ASSESSMENT & PLAN NOTE
Chronic, stable. The patient is trying to stay active at home. She walks for 1 hour every day. The patient reports a heart healthy diet with plenty of fruits vegetables and lean protein.  Follow-up at least annually.

## 2024-05-09 NOTE — ASSESSMENT & PLAN NOTE
Chronic, stable. The patient reports that she recently lost her mother. She is trying to cope. She has good family support. She declines counseling at this time.  Follow-up at least annually.

## 2024-05-09 NOTE — ASSESSMENT & PLAN NOTE
Chronic, stable. The patient denies any signs or symptoms of infection. Continue with current defined treatment plan: surveillance. Follow-up at least annually.

## 2024-05-09 NOTE — ASSESSMENT & PLAN NOTE
Chronic, stable. The patient reports that her symptoms are well controlled with current medication. Continue with current defined treatment plan: Omeprazole  (PRILOSEC PO) . Follow-up at least annually.

## 2024-05-09 NOTE — ASSESSMENT & PLAN NOTE
Chronic, stable. The patient was not able to tolerate statins. She is currently taking OTC niacin. Continue with heart healthy diet and regular exercise.  Follow-up at least annually.

## 2024-05-09 NOTE — ASSESSMENT & PLAN NOTE
Chronic, stable. The patient denies any recent falls of fractures. Discussed adding weight bearing exercise such as walking for 30 minutes most of the days of the week.  Also discussed adequate calcium and vitamin D ingestion and optimal diet.  Chronic, stable. Continue with current defined treatment plan: alendronate (FOSAMAX) 70 MG Tab . Follow-up at least annually.

## 2024-05-23 ENCOUNTER — APPOINTMENT (OUTPATIENT)
Dept: PHYSICAL MEDICINE AND REHAB | Facility: MEDICAL CENTER | Age: 64
End: 2024-05-23
Payer: MEDICARE

## 2024-05-23 VITALS
WEIGHT: 135 LBS | BODY MASS INDEX: 24.84 KG/M2 | DIASTOLIC BLOOD PRESSURE: 68 MMHG | HEART RATE: 64 BPM | HEIGHT: 62 IN | OXYGEN SATURATION: 97 % | TEMPERATURE: 97.2 F | SYSTOLIC BLOOD PRESSURE: 114 MMHG

## 2024-05-23 DIAGNOSIS — S92.145D CLOSED NONDISPLACED FRACTURE OF DOME OF LEFT TALUS WITH ROUTINE HEALING, SUBSEQUENT ENCOUNTER: ICD-10-CM

## 2024-05-23 DIAGNOSIS — M76.32 IT BAND SYNDROME, LEFT: ICD-10-CM

## 2024-05-23 DIAGNOSIS — M62.9 HAMSTRING TIGHTNESS OF BOTH LOWER EXTREMITIES: ICD-10-CM

## 2024-05-23 DIAGNOSIS — M47.817 FACET ARTHROPATHY, LUMBOSACRAL: ICD-10-CM

## 2024-05-23 DIAGNOSIS — M70.62 GREATER TROCHANTERIC BURSITIS OF LEFT HIP: ICD-10-CM

## 2024-05-23 DIAGNOSIS — Z71.82 EXERCISE COUNSELING: ICD-10-CM

## 2024-05-23 DIAGNOSIS — Z59.9 FINANCIAL DIFFICULTIES: ICD-10-CM

## 2024-05-23 PROCEDURE — 1170F FXNL STATUS ASSESSED: CPT | Performed by: GENERAL PRACTICE

## 2024-05-23 PROCEDURE — 1125F AMNT PAIN NOTED PAIN PRSNT: CPT | Performed by: GENERAL PRACTICE

## 2024-05-23 PROCEDURE — 20550 NJX 1 TENDON SHEATH/LIGAMENT: CPT | Mod: 59,LT | Performed by: GENERAL PRACTICE

## 2024-05-23 PROCEDURE — 3074F SYST BP LT 130 MM HG: CPT | Performed by: GENERAL PRACTICE

## 2024-05-23 PROCEDURE — 99213 OFFICE O/P EST LOW 20 MIN: CPT | Mod: 25 | Performed by: GENERAL PRACTICE

## 2024-05-23 PROCEDURE — 3078F DIAST BP <80 MM HG: CPT | Performed by: GENERAL PRACTICE

## 2024-05-23 PROCEDURE — 20611 DRAIN/INJ JOINT/BURSA W/US: CPT | Mod: LT | Performed by: GENERAL PRACTICE

## 2024-05-23 SDOH — ECONOMIC STABILITY - INCOME SECURITY: PROBLEM RELATED TO HOUSING AND ECONOMIC CIRCUMSTANCES, UNSPECIFIED: Z59.9

## 2024-05-23 ASSESSMENT — FIBROSIS 4 INDEX: FIB4 SCORE: 1.53

## 2024-05-23 ASSESSMENT — PATIENT HEALTH QUESTIONNAIRE - PHQ9: CLINICAL INTERPRETATION OF PHQ2 SCORE: 0

## 2024-05-23 ASSESSMENT — PAIN SCALES - GENERAL: PAINLEVEL: 2=MINIMAL-SLIGHT

## 2024-05-23 NOTE — PROCEDURES
TRIGGER POINT INJECTION     Date of Service: 5/23/2024     Physician/s: Abilio Slater D.O.    Pre-operative Diagnosis: Myalgia (M79.1)    Post-operative Diagnosis: Myalgia (M79.1)    Procedure: LEFT iliotibial band trigger point injections at 4 locations       The risks, benefits, and alternatives of the procedure were reviewed and discussed with the patient.  Written informed consent was freely obtained. A pre-procedural time-out was conducted by the physician verifying patient’s identity, procedure to be performed, procedure site and side, and allergy verification. Appropriate equipment was determined to be in place for the procedure.     The site as well as the side was identified  and the patient was counseled on the procedure along with the side effects, which are not limited to increased pain, hematoma, atrophy of tissues, infection, nerve damage, CRPS (RSD), skin dimpling, loss of skin/subcutaneous tissue/fat tissue, paraplegia, tetraplegia, septicemia, and other known and unknown effects which might be harmful/fatal.  The patient understood the same and verbalized agreement. It was carried out as below:     Solution used:   -total 10 ml mixed; 5ml 1% lidocaine, 5ml 0.25% bupivacaine    Total solution used:  8,5    Injected: Myofascial trigger points at left iliotibial band were inactivated via needling procedure under aseptic precautions. A twitch response was obtained.     In the office suite exam room the patient was placed in a right lateral position and the skin areas for injection over the left iliotibial band was marked. A solution was prepared as above. Ultrasound was confirmed to view the adjacent structures for blood vessels and nerves and to confirm the needle path was not within the structures nor was it too deep to be within the lung. A 27g needle was placed into each of the markings at the areas above under ultrasound guidance with an in plane approach.. After negative aspiration, approximately a  1.5 mL of the above solution was injected. The needle was removed intact after each trigger point injection, and the patient's back was covered with a 4x4 gauze, gentle soft tissue massage, and a dressing was applied. There were no complications noted. The images were uploaded to our media tab for permanent storage.    Postprocedure care explained to the patient regarding icing as well as stretching activity.      Instructed patient to avoid submerging in water for at least 24 to 48 hours.    Instructed patient to monitor injection site for signs of infection.    Preprocedural pain: 6/10  Postprocedural pain: 3/10    Tendon sheath or Ligament:  76161 (iliolumbar ligament, trigger finger, De Quervain's tenosynovitis, plantar fascia)  Tendon origin/insertion:  [id number]      Pre-operative Diagnosis: LEFT  greater trochanteric bursitis    Post-operative Diagnosis: LEFT  greater trochanteric bursitis    Procedure: LEFT  greater trochanteric bursa injection ultrasound-guided    Description of procedure:    The risks, benefits, and alternatives of the procedure were reviewed and discussed with the patient.  Written informed consent was freely obtained. A pre-procedural time-out was conducted by the physician verifying patient’s identity, procedure to be performed, procedure site and side, and allergy verification. Appropriate equipment was determined to be in place for the procedure.     No sedation was used for this procedure.     In the office suite the patient was placed in a lateral position with his  LEFT  side up, and the skin was prepped and draped in the usual sterile fashion. The ultrasound probe was placed over the LEFT  greater trochanter and the joint space was located and the target for injection was marked. A 27g 1.5 inch needle was placed into skin and advanced under ultrasound guidance into the greater trochanteric bursa. Following negative aspiration, approx 2mL of 1% lidocaine, 2ml of bupivicaine was then  injected into the joint, and the needle was subsequently removed intact after restyleted. The patient's hip was wiped with a 4x4 gauze, the area was cleansed with alcohol prep, and a bandaid was applied. There were no complications noted. The images were saved for permanent storage.      Preprocedural pain: 6/10  Postprocedural pain: 3/10        Postprocedure care explained to the patient regarding icing as well as stretching activity.      Instructed patient to avoid submerging in water for at least 24 to 48 hours.    Instructed patient to monitor injection site for signs of infection.    Major joint/bursa:  08688 (knee, hip, shoulder, trochanteric bursa, subacromial bursa, pes anserine bursa)        Abilio Slater DO  Physical Medicine and Rehabilitation  [facility]

## 2024-05-23 NOTE — PROGRESS NOTES
Physiatry (Physical Medicine and  Rehabilitation)     Soledad Jordan  MRN#: [id number]  : 1960      Date of service: See epic    Chief complaint:   Chief Complaint   Patient presents with    Follow-Up     2m fv post xray        Referring provider: Sonia Ramirez P.A.*    HISTORY    HPI:  Soledad Jordan is a 64 y.o. RHD very pleasant female with past medical history of left leg pain and primary osteoarthritis of both hands who presents with a referral for left leg pain      Medical records review:  I reviewed the note from the referring provider Sonia Ramirez P.A.* including the note dated 2023.    Prior Procedures:   2023 Ultrasound-Guided left greater trochanteric with no corticosteroid and 1 day relief.       HPI  2024 since last visit, patient reported continued improvement meant of the greater trochanter.  However, still reporting pain along the IT band mid thigh and at her knee.  Overall pain is 2/10 but increased pain level upon elevation.  Still able to tolerate sitting for 2 hours, walking for 1 hour, standing for 3 to 4 hours.  Still sleeping for 7 hours at night.  No associated symptoms or trauma or injury.  Would like to have trigger point injections.  For her left foot, she still has pain near the lateral malleolus and over the talar dome.  Imaging did reveal a small fracture.  still has point tenderness.  Patient reported financial difficulty as replaced and we have asked about delaying x-ray.    2024 since last visit/injection 23, patient has reported improvement for roughly 3 months.  Would like to hold on injection as patient had a recent death in the family.  However improvement with ambulation and performing activities of daily living.  In addition, reported that she still having chronic left lateral ankle pain.  She has a history of a prior ankle fracture but this is not completely inhibiting her ambulation.  No recent injury, falls, or trauma.  No associated  symptoms:  No radiation of pain downBLE, incontinence, progressive weakness, saddle anesthesia, or coordination issues.  The patient  denies any fevers, chills, chest pain or shortness of breath.       11/29/2023 since last visit, patient has reported no significant improvement status post left greater trochanter injection.  No new medical testing.  No new physical therapy appointments.  Still can sit for 1 hour, walk for 45 minutes, stand for 3 to 4 hours.  Still sleeping for 7 hours.  No other medications.  Still stated pain is aching along gluteal.  Still burning and tingling sensations down the left lateral leg. Pain mostly at the end of day.  No recent injury or trauma.  No associated symptoms:  no radiation of pain down BLE, incontinence, progressive weakness, or saddle anesthesia.     Patient reported left plantar foot pain.  Sharp with palpation and worse in the mornings.    11/8/23 Subacute on chronic low back pain with referred into left buttock.  Painful sitting.  Current pain level 3/10 and started off as 9/10.  Reported numbness and tingling at night.  Currently engaged with physical therapy and effective for therapy notes..  Ibuprofen is effective for pain control.  Affecting activities of daily living such as flexion, squatting, standing tolerance, and housework tolerance.      In addition, reported left greater trochanter pain reproducible.  Difficulty lying on side.  Restricting physical therapy.  Pain also at Gerdy's tubercle. would like to consider injection without corticosteroid.      ROS:   Red Flags ROS:   Fever, Chills, Sweats: Denies  Involuntary Weight Loss: Denies  Bladder Incontinence: Denies  Bowel Incontinence: denies  Saddle Anesthesia: Denies          GOALS OF TREATMENT: Symptom/Pain relief. improve function.        PMHx:   Past Medical History:   Diagnosis Date    Dyslipidemia     GERD (gastroesophageal reflux disease)     H/O mammogram 2013    normal    Pap smear for cervical cancer  screening 2012    normal    PUD (peptic ulcer disease)        PSHx:   Past Surgical History:   Procedure Laterality Date    OTHER ORTHOPEDIC SURGERY  01/01/2009    left shoulder    OTHER ORTHOPEDIC SURGERY  01/01/2005    left knee    ABDOMINAL HYSTERECTOMY TOTAL      ovaries/uterus    APPENDECTOMY      CHOLECYSTECTOMY      OTHER ORTHOPEDIC SURGERY Left     bunion left foot       Family history   Family History   Problem Relation Age of Onset    Cancer Neg Hx     Ovarian Cancer Neg Hx     Tubal Cancer Neg Hx     Peritoneal Cancer Neg Hx     Colorectal Cancer Neg Hx     Breast Cancer Neg Hx     Bilateral Breast Cancer Neg Hx     Diabetes Neg Hx     Heart Disease Neg Hx     Hypertension Neg Hx        Medications: reviewed on epic.   Outpatient Medications Marked as Taking for the 5/23/24 encounter (Office Visit) with Abilio Slater D.O.   Medication Sig Dispense Refill    NIACIN ER PO Take  by mouth.      alendronate (FOSAMAX) 70 MG Tab Take 1 Tablet by mouth every 7 days. 12 Tablet 3    omeprazole (PRILOSEC) 20 MG delayed-release capsule TAKE 1 CAPSULE BY MOUTH ONCE DAILY 30 MINUTES BEFORE MORNING MEAL 90 Capsule 3    ondansetron (ZOFRAN ODT) 4 MG TABLET DISPERSIBLE Take 1 Tablet by mouth every 6 hours as needed for Nausea/Vomiting for up to 15 doses. 15 Tablet 0    fluconazole (DIFLUCAN) 150 MG tablet Use 1 pill (one dose) as needed for symptoms of yeast infection. May repeat after 3 days if needed. 5 Tablet 0    triamcinolone acetonide (KENALOG) 0.025 % Cream Apply  topically 1 time a day as needed.          Allergies:   Allergies   Allergen Reactions    Phenergan [Promethazine Hcl] Swelling     Of the FACE     Oxycodone-Acetaminophen Unspecified    Percocet [Oxycodone-Acetaminophen] Vomiting       Social Hx:   Social History     Socioeconomic History    Marital status:      Spouse name: Not on file    Number of children: Not on file    Years of education: Not on file    Highest education level: Not on file  "  Occupational History    Not on file   Tobacco Use    Smoking status: Never    Smokeless tobacco: Never   Vaping Use    Vaping status: Never Used   Substance and Sexual Activity    Alcohol use: No    Drug use: No    Sexual activity: Yes     Partners: Male   Other Topics Concern    Not on file   Social History Narrative    Not on file     Social Determinants of Health     Financial Resource Strain: Low Risk  (5/9/2024)    Overall Financial Resource Strain (CARDIA)     Difficulty of Paying Living Expenses: Not very hard   Food Insecurity: No Food Insecurity (5/9/2024)    Hunger Vital Sign     Worried About Running Out of Food in the Last Year: Never true     Ran Out of Food in the Last Year: Never true   Transportation Needs: No Transportation Needs (5/9/2024)    PRAPARE - Transportation     Lack of Transportation (Medical): No     Lack of Transportation (Non-Medical): No   Physical Activity: Not on file   Stress: Not on file   Social Connections: Not on file   Intimate Partner Violence: Not on file   Housing Stability: Low Risk  (5/9/2024)    Housing Stability Vital Sign     Unable to Pay for Housing in the Last Year: No     Number of Places Lived in the Last Year: 1     Unstable Housing in the Last Year: No         EXAMINATION   Vitals: /68 (BP Location: Left arm, Patient Position: Sitting, BP Cuff Size: Adult)   Pulse 64   Temp 36.2 °C (97.2 °F) (Temporal)   Ht 1.575 m (5' 2\")   Wt 61.2 kg (135 lb)   SpO2 97%   Physical Exam:     Body Habitus: Body mass index is 24.69 kg/m².  Appearance: Well-groomed, well-nourished, not disheveled  Eyes: No scleral icterus to suggest severe liver disease, no proptosis to suggest severe hyperthyroid  ENT -no obvious auditory deficits, no external lesions, moist mucus membranes   Skin -no rashes or lesions noted. No appreciable skin breakdown on exposed skin areas.    Respiratory-  breathing comfortably on room air, no audible wheezing, full sentences  Cardiovascular- No " lower extremity edema noted.   Psychiatric- alert and oriented,  calm, comfortable, cooperative     Musculoskeletal and Neuro:  Gait and station - normal gait with reciprocal pattern,  no use of ambulatory device, no arm assistance with sit-to-stand, nonantalgic. no loss of balance.  No change in patient's demeanor with exam.    Grossly normal cranial nerve exam and sensory exam grossly  Coordination grossly intact  Single leg balance / Trendelenburg:  normal in balance and control      palpation in hip or over the gluteus medius tendon insertion: negative right, positive left inferior side    Tenderness to palpation along IT band and at Gerdy's tubercle left     Lumbar spine Special tests  Neuro tension  Straight leg test negative bilaterally     HIP  FAIR test negative bilaterally    Range of motion in the RIGHT hip is full  in flexion, extension, abduction, internal rotation, external rotation.  Range of motion in the LEFT hip is full  in flexion, extension, abduction, internal rotation, external rotation.  Pedrito negative bilaterally  Hamstring tightness with forward flexion   Negative gaenslen test bilaterally    SI joint tests  Observation patient sits on one buttocks: negative  Thigh thrust test negative bilaterally   PEDRITO test negative bilaterally    Key points for the international standards for neurological classification of spinal cord injury (ISNCSCI) to light touch.   Dermatome R L   L2 2 2   L3 2 2   L4 2 2   L5 2 2   S1 2 2     Motor Exam Lower Extremities  ? Myotome R L   Hip flexion L2 5 5   Knee extension L3 5 5   Ankle dorsiflexion L4 5 5   Toe extension L5 5 5   Ankle plantarflexion S1 5 5   Hip Abduction  5 5   Hip Adduction  5 5     Reflexes  Clonus of the ankle negative bilaterally     Left ankle (compared w/ unaffected side)    tenderness to palpation over the talar dome  Negative anterior drawer test, posterior drawer test, midfoot twist, squeeze test.  Comparable to contralateral  "ankle.  Normal passive range of motion      MEDICAL DECISION MAKING    Medical records review: see under HPI section.     DATA    Labs:   Lab Results   Component Value Date/Time    SODIUM 137 03/21/2024 06:58 AM    POTASSIUM 4.7 03/21/2024 06:58 AM    CHLORIDE 103 03/21/2024 06:58 AM    CO2 21 03/21/2024 06:58 AM    ANION 13.0 03/21/2024 06:58 AM    GLUCOSE 96 03/21/2024 06:58 AM    BUN 10 03/21/2024 06:58 AM    CREATININE 0.70 03/21/2024 06:58 AM    CALCIUM 9.3 03/21/2024 06:58 AM    ASTSGOT 25 03/21/2024 06:58 AM    ALTSGPT 14 03/21/2024 06:58 AM    TBILIRUBIN 0.4 03/21/2024 06:58 AM    ALBUMIN 4.7 03/21/2024 06:58 AM    TOTPROTEIN 7.4 03/21/2024 06:58 AM    GLOBULIN 2.7 03/21/2024 06:58 AM    AGRATIO 1.7 03/21/2024 06:58 AM   ]    No results found for: \"PROTHROMBTM\", \"INR\"     Lab Results   Component Value Date/Time    WBC 3.9 (L) 03/21/2024 06:58 AM    RBC 4.28 03/21/2024 06:58 AM    HEMOGLOBIN 13.7 03/21/2024 06:58 AM    HEMATOCRIT 41.8 03/21/2024 06:58 AM    MCV 97.7 03/21/2024 06:58 AM    MCH 32.0 03/21/2024 06:58 AM    MCHC 32.8 03/21/2024 06:58 AM    MPV 9.9 03/21/2024 06:58 AM    NEUTSPOLYS 45.30 03/21/2024 06:58 AM    LYMPHOCYTES 44.30 (H) 03/21/2024 06:58 AM    MONOCYTES 8.10 03/21/2024 06:58 AM    EOSINOPHILS 1.50 03/21/2024 06:58 AM    BASOPHILS 0.50 03/21/2024 06:58 AM        Lab Results   Component Value Date/Time    HBA1C 5.5 03/21/2024 06:58 AM        Imaging:   I personally reviewed following images, these are my reads  Left hip x-ray 9/6/2023: Visible lumbar spondylosis.  L5-S1 facet arthropathy.  Subtle SI joint pathology bilaterally.  Osteoarthritis of the greater trochanters    IMAGING radiology reads. I reviewed the following radiology reads         9/1/2023 10:28 AM     HISTORY/REASON FOR EXAM:  Atraumatic Pelvic/Hip Pain; left SI joint/left hip/left leg pain.  LEFT hip pain for one month.     TECHNIQUE/EXAM DESCRIPTION AND NUMBER OF VIEWS:  2 views of the LEFT hip.     COMPARISON: None   "   FINDINGS:  Pelvis and sacrum are intact  Visualized LEFT proximal femur is intact and normally located  Mild loss of joint space in both hips.  Calcified phleboliths in the pelvis.     IMPRESSION:     1.  No LEFT hip or pelvic fracture.  2.  Mild degenerative change of both hips.                                                                                                      Diagnosis   Visit Diagnoses     ICD-10-CM   1. Greater trochanteric bursitis of left hip  M70.62   2. It band syndrome, left  M76.32   3. Facet arthropathy, lumbosacral  M47.817   4. Hamstring tightness of both lower extremities  M62.9   5. Exercise counseling  Z71.82   6. Closed nondisplaced fracture of dome of left talus with routine healing, subsequent encounter  S92.145D   7. Financial difficulties  Z59.9             ASSESSMENT AND PLAN:  Soledad Jordan 64 y.o. female  Left greater trochanter syndrome painful and would like to proceed with nonsteroidal injection.     Left IT band syndrome: Tenderness to palpation reproducible; will proceed with trigger point injections and have patient follow-up in 2 weeks.  Left talar dome fracture.  Recommend patient to have repeat x-ray assessment performed March 21 month after the order was placed.  Patient stated she would like to monitor and not get the x-ray.  Will consider referral to orthopedics.  X-ray.  Patient is still able to ambulate.  grief: PHQ9 of 0; loss of family member and informed patient of counseling if interested; followed by PCP with good family support  Age-related osteoporosis: Taking Fosamax  Prediabetes  Dietary and exercising counseling discussed.  Extensive discussion regarding treatment options, at this time recommending the following:    Orders Placed This Encounter    DX-FOOT-COMPLETE 3+ LEFT    REFERRAL TO CARE MANAGEMENT    Consent for all Surgical, Special Diagnostic or Therapeutic Procedures         PLAN    -Medications/Modalities:   -You may also try ice packs or  heating pads to help with pain for no more than 15 minutes at a time  -Limitations:   Activity as tolerated  -Brace/orthotic/assistive devices: Not indicated at this time  -Therapy (PT/OT/Aquatherapy): Continue to focus on strengthening and stretching  -Home exercise program: encouraged and demonstrated home exercises of regular strengthening and stretching and provided exercise program for plantar fasciitis, single-leg balance, and ankle strengthening  -Office Injections: will perform with this visit   Patient would like to proceed with injection of left iliotibial band and left greater trochanter without corticosteroids.  The risks, benefits, and alternatives to this procedure were discussed and the patient wishes to proceed with the procedure. Risks include but are not limited to damage to surrounding structures, infection, bleeding, worsening of pain which can be permanent, and weakness which can be permanent. Benefits include pain relief and improved function. Alternatives include not doing the procedure.   Diagnostic workup: reviewed today as above; ordered additional foot x-ray to monitor  Interventional program: I would  consider the patient for an epidural steroid injection depending on the results of the above   Referrals: Will consider orthopedics    Follow-up: Return to clinic in 2 weeks post imaging and monitor trigger point injections  Patient expressed understanding of the management plan. Patient (and Family Members) was/were encouraged to call if any worries, issues, problems or concerns prior to the next visit     Please note that this dictation was created using voice recognition software. I have made every reasonable attempt to correct obvious errors but there may be errors of grammar and content that I may have overlooked prior to finalization of this note.    My total time spent caring for the patient on the day of the encounter was 23 minutes.   This does not include time spent on separately  billable procedures/tests.      Abilio Slater, DO  Physical Medicine and Rehabilitation  [facility]         DMITRI Ramirez P.A.-C.   CC Sonia Ramirez P.A.*

## 2024-06-05 ENCOUNTER — PATIENT OUTREACH (OUTPATIENT)
Dept: HEALTH INFORMATION MANAGEMENT | Facility: OTHER | Age: 64
End: 2024-06-05
Payer: MEDICARE

## 2024-06-05 NOTE — PROGRESS NOTES
Community Health Worker Intake     Synopsis: Chw received referral from patient's PCP to help with financial resources. Chw completed call with patient over phone and she states, her provider would like her to have another Xray and additional follow up visit. The patient states these visits/procedures are expensive her at this time. She was hoping to see if there are any financial options or resources for her. Chw informed patient her best course of action would be to contact her [facility] plus,  and explore any available options for financial assistance. Soledad informed this chw, she already is aware her [facility] plan offers split payments for X-rays, visits and other procedures. Pt states she will pursue this. Chw also informed patient, he could provide her with information for [employer]'s billing department for additional financial assistance. Pt state she may already have this info. She thanked this chw for the call and attempt to assist. Finally, chw informed patient he would contact her in the case, he is able to find additional resources. Pt thanked me for the outreach.

## 2024-08-06 ENCOUNTER — OFFICE VISIT (OUTPATIENT)
Dept: URGENT CARE | Facility: PHYSICIAN GROUP | Age: 64
End: 2024-08-06
Payer: MEDICARE

## 2024-08-06 VITALS
HEART RATE: 78 BPM | RESPIRATION RATE: 16 BRPM | DIASTOLIC BLOOD PRESSURE: 80 MMHG | TEMPERATURE: 97.1 F | WEIGHT: 131.4 LBS | OXYGEN SATURATION: 99 % | SYSTOLIC BLOOD PRESSURE: 136 MMHG | HEIGHT: 63 IN | BODY MASS INDEX: 23.28 KG/M2

## 2024-08-06 DIAGNOSIS — J04.0 LARYNGITIS: ICD-10-CM

## 2024-08-06 DIAGNOSIS — J02.9 PHARYNGITIS, UNSPECIFIED ETIOLOGY: ICD-10-CM

## 2024-08-06 DIAGNOSIS — R05.1 ACUTE COUGH: ICD-10-CM

## 2024-08-06 LAB — S PYO DNA SPEC NAA+PROBE: NOT DETECTED

## 2024-08-06 PROCEDURE — 99213 OFFICE O/P EST LOW 20 MIN: CPT | Performed by: STUDENT IN AN ORGANIZED HEALTH CARE EDUCATION/TRAINING PROGRAM

## 2024-08-06 PROCEDURE — 3075F SYST BP GE 130 - 139MM HG: CPT | Performed by: STUDENT IN AN ORGANIZED HEALTH CARE EDUCATION/TRAINING PROGRAM

## 2024-08-06 PROCEDURE — 1170F FXNL STATUS ASSESSED: CPT | Performed by: STUDENT IN AN ORGANIZED HEALTH CARE EDUCATION/TRAINING PROGRAM

## 2024-08-06 PROCEDURE — 87651 STREP A DNA AMP PROBE: CPT | Performed by: STUDENT IN AN ORGANIZED HEALTH CARE EDUCATION/TRAINING PROGRAM

## 2024-08-06 PROCEDURE — 3079F DIAST BP 80-89 MM HG: CPT | Performed by: STUDENT IN AN ORGANIZED HEALTH CARE EDUCATION/TRAINING PROGRAM

## 2024-08-06 RX ORDER — METHYLPREDNISOLONE 4 MG
TABLET, DOSE PACK ORAL
Qty: 21 TABLET | Refills: 0 | Status: SHIPPED | OUTPATIENT
Start: 2024-08-06

## 2024-08-06 RX ORDER — BENZONATATE 100 MG/1
200 CAPSULE ORAL 3 TIMES DAILY PRN
Qty: 60 CAPSULE | Refills: 0 | Status: SHIPPED | OUTPATIENT
Start: 2024-08-06

## 2024-08-06 ASSESSMENT — FIBROSIS 4 INDEX: FIB4 SCORE: 1.53

## 2024-09-17 ENCOUNTER — HOSPITAL ENCOUNTER (OUTPATIENT)
Dept: LAB | Facility: MEDICAL CENTER | Age: 64
End: 2024-09-17
Attending: PHYSICIAN ASSISTANT
Payer: MEDICARE

## 2024-09-17 DIAGNOSIS — Z11.59 NEED FOR HEPATITIS C SCREENING TEST: ICD-10-CM

## 2024-09-17 DIAGNOSIS — D72.819 LEUKOPENIA, UNSPECIFIED TYPE: ICD-10-CM

## 2024-09-17 LAB
BASOPHILS # BLD AUTO: 0.4 % (ref 0–1.8)
BASOPHILS # BLD: 0.03 K/UL (ref 0–0.12)
EOSINOPHIL # BLD AUTO: 0.01 K/UL (ref 0–0.51)
EOSINOPHIL NFR BLD: 0.1 % (ref 0–6.9)
ERYTHROCYTE [DISTWIDTH] IN BLOOD BY AUTOMATED COUNT: 43.8 FL (ref 35.9–50)
HCT VFR BLD AUTO: 42.3 % (ref 37–47)
HCV AB SER QL: NORMAL
HGB BLD-MCNC: 14.3 G/DL (ref 12–16)
IMM GRANULOCYTES # BLD AUTO: 0.03 K/UL (ref 0–0.11)
IMM GRANULOCYTES NFR BLD AUTO: 0.4 % (ref 0–0.9)
LYMPHOCYTES # BLD AUTO: 1.79 K/UL (ref 1–4.8)
LYMPHOCYTES NFR BLD: 26.4 % (ref 22–41)
MCH RBC QN AUTO: 32.2 PG (ref 27–33)
MCHC RBC AUTO-ENTMCNC: 33.8 G/DL (ref 32.2–35.5)
MCV RBC AUTO: 95.3 FL (ref 81.4–97.8)
MONOCYTES # BLD AUTO: 0.39 K/UL (ref 0–0.85)
MONOCYTES NFR BLD AUTO: 5.8 % (ref 0–13.4)
NEUTROPHILS # BLD AUTO: 4.53 K/UL (ref 1.82–7.42)
NEUTROPHILS NFR BLD: 66.9 % (ref 44–72)
NRBC # BLD AUTO: 0 K/UL
NRBC BLD-RTO: 0 /100 WBC (ref 0–0.2)
PLATELET # BLD AUTO: 324 K/UL (ref 164–446)
PMV BLD AUTO: 9.3 FL (ref 9–12.9)
RBC # BLD AUTO: 4.44 M/UL (ref 4.2–5.4)
WBC # BLD AUTO: 6.8 K/UL (ref 4.8–10.8)

## 2024-09-17 PROCEDURE — 36415 COLL VENOUS BLD VENIPUNCTURE: CPT

## 2024-09-17 PROCEDURE — 85025 COMPLETE CBC W/AUTO DIFF WBC: CPT

## 2024-09-17 PROCEDURE — G0472 HEP C SCREEN HIGH RISK/OTHER: HCPCS

## 2024-10-01 ENCOUNTER — APPOINTMENT (OUTPATIENT)
Dept: MEDICAL GROUP | Facility: PHYSICIAN GROUP | Age: 64
End: 2024-10-01
Payer: MEDICARE

## 2024-10-01 VITALS
WEIGHT: 134 LBS | SYSTOLIC BLOOD PRESSURE: 130 MMHG | DIASTOLIC BLOOD PRESSURE: 70 MMHG | HEART RATE: 72 BPM | HEIGHT: 63 IN | BODY MASS INDEX: 23.74 KG/M2 | RESPIRATION RATE: 16 BRPM | OXYGEN SATURATION: 98 % | TEMPERATURE: 97.6 F

## 2024-10-01 DIAGNOSIS — N89.8 VAGINAL DRYNESS: ICD-10-CM

## 2024-10-01 DIAGNOSIS — F43.21 GRIEF: ICD-10-CM

## 2024-10-01 DIAGNOSIS — E55.9 VITAMIN D DEFICIENCY: ICD-10-CM

## 2024-10-01 DIAGNOSIS — K21.9 GASTROESOPHAGEAL REFLUX DISEASE WITHOUT ESOPHAGITIS: ICD-10-CM

## 2024-10-01 DIAGNOSIS — M79.605 LEFT LEG PAIN: ICD-10-CM

## 2024-10-01 DIAGNOSIS — Z23 NEED FOR VACCINATION: ICD-10-CM

## 2024-10-01 DIAGNOSIS — E78.5 DYSLIPIDEMIA: ICD-10-CM

## 2024-10-01 DIAGNOSIS — R53.83 FATIGUE, UNSPECIFIED TYPE: ICD-10-CM

## 2024-10-01 DIAGNOSIS — Z78.0 POSTMENOPAUSAL: ICD-10-CM

## 2024-10-01 DIAGNOSIS — Z71.89 ADVANCE CARE PLANNING: ICD-10-CM

## 2024-10-01 DIAGNOSIS — M81.0 AGE-RELATED OSTEOPOROSIS WITHOUT CURRENT PATHOLOGICAL FRACTURE: ICD-10-CM

## 2024-10-01 DIAGNOSIS — Z12.31 ENCOUNTER FOR SCREENING MAMMOGRAM FOR MALIGNANT NEOPLASM OF BREAST: ICD-10-CM

## 2024-10-01 DIAGNOSIS — M25.50 ARTHRALGIA, UNSPECIFIED JOINT: ICD-10-CM

## 2024-10-01 DIAGNOSIS — R73.02 IGT (IMPAIRED GLUCOSE TOLERANCE): ICD-10-CM

## 2024-10-01 PROBLEM — D72.819 LEUKOPENIA: Status: RESOLVED | Noted: 2023-03-01 | Resolved: 2024-10-01
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
## 2024-10-01 PROCEDURE — 99214 OFFICE O/P EST MOD 30 MIN: CPT | Performed by: PHYSICIAN ASSISTANT

## 2024-10-01 PROCEDURE — 3078F DIAST BP <80 MM HG: CPT | Performed by: PHYSICIAN ASSISTANT

## 2024-10-01 PROCEDURE — 1170F FXNL STATUS ASSESSED: CPT | Performed by: PHYSICIAN ASSISTANT

## 2024-10-01 PROCEDURE — 3075F SYST BP GE 130 - 139MM HG: CPT | Performed by: PHYSICIAN ASSISTANT

## 2024-10-01 RX ORDER — EZETIMIBE 10 MG/1
10 TABLET ORAL DAILY
Qty: 90 TABLET | Refills: 1 | Status: SHIPPED | OUTPATIENT
Start: 2024-10-01

## 2024-10-01 ASSESSMENT — ENCOUNTER SYMPTOMS
CHILLS: 0
SHORTNESS OF BREATH: 0
FEVER: 0

## 2024-10-01 ASSESSMENT — FIBROSIS 4 INDEX: FIB4 SCORE: 1.36

## 2024-10-14 ENCOUNTER — APPOINTMENT (OUTPATIENT)
Dept: MEDICAL GROUP | Facility: PHYSICIAN GROUP | Age: 64
End: 2024-10-14
Payer: MEDICARE

## 2024-11-14 ENCOUNTER — OFFICE VISIT (OUTPATIENT)
Dept: URGENT CARE | Facility: PHYSICIAN GROUP | Age: 64
End: 2024-11-14
Payer: MEDICARE

## 2024-11-14 VITALS
HEART RATE: 90 BPM | TEMPERATURE: 99.8 F | HEIGHT: 63 IN | WEIGHT: 132.06 LBS | RESPIRATION RATE: 16 BRPM | BODY MASS INDEX: 23.4 KG/M2 | OXYGEN SATURATION: 97 % | SYSTOLIC BLOOD PRESSURE: 118 MMHG | DIASTOLIC BLOOD PRESSURE: 76 MMHG

## 2024-11-14 DIAGNOSIS — J06.9 VIRAL URI WITH COUGH: ICD-10-CM

## 2024-11-14 DIAGNOSIS — U07.1 COVID-19: ICD-10-CM

## 2024-11-14 LAB
FLUAV RNA SPEC QL NAA+PROBE: NEGATIVE
FLUBV RNA SPEC QL NAA+PROBE: NEGATIVE
RSV RNA SPEC QL NAA+PROBE: NEGATIVE
SARS-COV-2 RNA RESP QL NAA+PROBE: POSITIVE

## 2024-11-14 PROCEDURE — 1170F FXNL STATUS ASSESSED: CPT | Performed by: PHYSICIAN ASSISTANT

## 2024-11-14 PROCEDURE — 3074F SYST BP LT 130 MM HG: CPT | Performed by: PHYSICIAN ASSISTANT

## 2024-11-14 PROCEDURE — 3078F DIAST BP <80 MM HG: CPT | Performed by: PHYSICIAN ASSISTANT

## 2024-11-14 PROCEDURE — 0241U POCT CEPHEID COV-2, FLU A/B, RSV - PCR: CPT | Performed by: PHYSICIAN ASSISTANT

## 2024-11-14 PROCEDURE — 99213 OFFICE O/P EST LOW 20 MIN: CPT | Performed by: PHYSICIAN ASSISTANT

## 2024-11-14 ASSESSMENT — ENCOUNTER SYMPTOMS
FEVER: 1
DIARRHEA: 0
EYE DISCHARGE: 0
SORE THROAT: 0
COUGH: 1
EYE REDNESS: 0
SHORTNESS OF BREATH: 0
NAUSEA: 1
VOMITING: 0
HEADACHES: 1
MYALGIAS: 1
CHILLS: 1

## 2024-11-14 ASSESSMENT — FIBROSIS 4 INDEX: FIB4 SCORE: 1.36

## 2024-11-14 NOTE — PROGRESS NOTES
Subjective     Soledad Jordan is a 64 y.o. female who presents with URI (Cold symptoms, body aches, headache, nausea, chills, stomach pain, cough X 2 days )            URI   This is a new problem. Episode onset: x 2 days ago. The problem has been unchanged. Maximum temperature: The patient reports an associated low-grade fever with a max temp of 99. Associated symptoms include congestion, coughing, headaches and nausea. Pertinent negatives include no chest pain, diarrhea, ear pain, sore throat or vomiting. Treatments tried: OTC DayQuil.     The patient states her granddaughter was recently sick with similar symptoms    PMH:  has a past medical history of Dyslipidemia, GERD (gastroesophageal reflux disease), H/O mammogram (2013), Pap smear for cervical cancer screening (2012), and PUD (peptic ulcer disease).  MEDS:   Current Outpatient Medications:     ezetimibe (ZETIA) 10 MG Tab, Take 1 Tablet by mouth every day. CHOLESTEROL, Disp: 90 Tablet, Rfl: 1    alendronate (FOSAMAX) 70 MG Tab, Take 1 Tablet by mouth every 7 days., Disp: 12 Tablet, Rfl: 3    omeprazole (PRILOSEC) 20 MG delayed-release capsule, TAKE 1 CAPSULE BY MOUTH ONCE DAILY 30 MINUTES BEFORE MORNING MEAL, Disp: 90 Capsule, Rfl: 3    ondansetron (ZOFRAN ODT) 4 MG TABLET DISPERSIBLE, Take 1 Tablet by mouth every 6 hours as needed for Nausea/Vomiting for up to 15 doses., Disp: 15 Tablet, Rfl: 0    fluconazole (DIFLUCAN) 150 MG tablet, Use 1 pill (one dose) as needed for symptoms of yeast infection. May repeat after 3 days if needed., Disp: 5 Tablet, Rfl: 0    triamcinolone acetonide (KENALOG) 0.025 % Cream, Apply  topically 1 time a day as needed., Disp: , Rfl:     NIACIN ER PO, Take  by mouth. (Patient not taking: Reported on 11/14/2024), Disp: , Rfl:   ALLERGIES:   Allergies   Allergen Reactions    Phenergan [Promethazine Hcl] Swelling     Of the FACE     Oxycodone-Acetaminophen Unspecified    Percocet [Oxycodone-Acetaminophen] Vomiting     SURGHX:   Past  "Surgical History:   Procedure Laterality Date    OTHER ORTHOPEDIC SURGERY  01/01/2009    left shoulder    OTHER ORTHOPEDIC SURGERY  01/01/2005    left knee    ABDOMINAL HYSTERECTOMY TOTAL      ovaries/uterus    APPENDECTOMY      CHOLECYSTECTOMY      OTHER ORTHOPEDIC SURGERY Left     bunion left foot     SOCHX:  reports that she has never smoked. She has never used smokeless tobacco. She reports that she does not drink alcohol and does not use drugs.  FH: Family history was reviewed, no pertinent findings to report      Review of Systems   Constitutional:  Positive for chills and fever (The patient reports an associated low-grade fever with a max temp of 99).   HENT:  Positive for congestion. Negative for ear pain and sore throat.    Eyes:  Negative for discharge and redness.   Respiratory:  Positive for cough. Negative for shortness of breath.    Cardiovascular:  Negative for chest pain.   Gastrointestinal:  Positive for nausea. Negative for diarrhea and vomiting.   Musculoskeletal:  Positive for myalgias.   Neurological:  Positive for headaches.              Objective     /76 (BP Location: Right arm, Patient Position: Sitting, BP Cuff Size: Adult)   Pulse 90   Temp 37.7 °C (99.8 °F) (Temporal)   Resp 16   Ht 1.6 m (5' 3\")   Wt 59.9 kg (132 lb 0.9 oz)   SpO2 97%   BMI 23.39 kg/m²      Physical Exam  Constitutional:       General: She is not in acute distress.     Appearance: Normal appearance. She is not ill-appearing.   HENT:      Head: Normocephalic and atraumatic.      Right Ear: Tympanic membrane, ear canal and external ear normal.      Left Ear: Tympanic membrane, ear canal and external ear normal.      Nose: Nose normal.      Mouth/Throat:      Mouth: Mucous membranes are moist.      Pharynx: Oropharynx is clear. No posterior oropharyngeal erythema.   Eyes:      Extraocular Movements: Extraocular movements intact.      Conjunctiva/sclera: Conjunctivae normal.   Cardiovascular:      Rate and " Rhythm: Normal rate and regular rhythm.      Heart sounds: Normal heart sounds.   Pulmonary:      Effort: Pulmonary effort is normal. No respiratory distress.      Breath sounds: Normal breath sounds. No wheezing.   Musculoskeletal:         General: Normal range of motion.      Cervical back: Normal range of motion and neck supple.   Skin:     General: Skin is warm and dry.   Neurological:      Mental Status: She is alert and oriented to person, place, and time.              Progress:  Results for orders placed or performed in visit on 11/14/24   POCT CoV-2, Flu A/B, RSV by PCR    Collection Time: 11/14/24  9:25 AM   Result Value Ref Range    SARS-CoV-2 by PCR Positive (A) Negative, Invalid    Influenza virus A RNA Negative Negative, Invalid    Influenza virus B, PCR Negative Negative, Invalid    RSV, PCR Negative Negative, Invalid            Assessment & Plan        Assessment & Plan  Viral URI with cough    Orders:    POCT CoV-2, Flu A/B, RSV by PCR    COVID-19                 The patient's presenting symptoms and physical exam findings are consistent with a viral URI with associated cough.  The patient's physical exam today in clinic was normal.  The patient's lungs are clear to auscultation without wheezing or rhonchi, and her pulse ox was within normal limits.  The patient is nontoxic and appears in no acute distress.  The patient's vital signs are stable and within normal limits.  She is afebrile today in clinic.  Discussed likely viral etiology with the patient.  The patient's POCT Cepheid viral testing today in clinic was positive for COVID-19.  This is consistent with the patient's current illness.  Informed patient of the CDC guidelines.  Offered the patient a prescription for Paxlovid, which she politely declined.  Advised the patient to monitor for worsening signs or symptoms.  Recommend OTC medications and supportive care for symptomatic management.  Recommend the patient follow-up with primary care as  needed.  Discussed return precautions with the patient, and she verbalized understanding.    Differential diagnoses, supportive care, and indications for immediate follow-up discussed with patient.   Instructed to return to clinic or nearest emergency department for any change in condition, further concerns, or worsening of symptoms.    OTC Tylenol or Motrin for fever/discomfort.  OTC cough/cold medication for symptomatic relief  OTC Supportive Care for Congestion - saline nasal spray or neti pot  Drink plenty of fluids  Follow-up with PCP  Return to clinic or go to the ED if symptoms worsen or fail to improve, or if the patient should develop worsening/increasing cough, congestion, ear pain, sore throat, shortness of breath, wheezing, chest pain, fever/chills, and/or any concerning symptoms.    Discussed plan with the patient, and she agrees to the above.     I personally reviewed prior external notes and test results pertinent to today's visit.  I have independently reviewed and interpreted all diagnostics ordered during this urgent care visit.     Please note that this dictation was created using voice recognition software. I have made every reasonable attempt to correct obvious errors, but I expect that there may be errors of grammar and possibly content that I did not discover before finalizing the note.     This note was electronically signed by Nadine Balbuena PA-C

## 2025-01-17 ENCOUNTER — TELEPHONE (OUTPATIENT)
Dept: HEALTH INFORMATION MANAGEMENT | Facility: OTHER | Age: 65
End: 2025-01-17
Payer: MEDICARE

## 2025-01-28 ENCOUNTER — HOSPITAL ENCOUNTER (OUTPATIENT)
Dept: RADIOLOGY | Facility: MEDICAL CENTER | Age: 65
End: 2025-01-28
Attending: PHYSICIAN ASSISTANT
Payer: MEDICARE

## 2025-01-28 DIAGNOSIS — Z78.0 POSTMENOPAUSAL: ICD-10-CM

## 2025-01-28 DIAGNOSIS — Z12.31 ENCOUNTER FOR SCREENING MAMMOGRAM FOR MALIGNANT NEOPLASM OF BREAST: ICD-10-CM

## 2025-01-28 PROCEDURE — 77067 SCR MAMMO BI INCL CAD: CPT

## 2025-01-28 PROCEDURE — 77080 DXA BONE DENSITY AXIAL: CPT

## 2025-01-29 ENCOUNTER — OFFICE VISIT (OUTPATIENT)
Dept: FAMILY PLANNING/WOMEN'S HEALTH CLINIC | Facility: PHYSICIAN GROUP | Age: 65
End: 2025-01-29
Payer: MEDICARE

## 2025-01-29 VITALS
DIASTOLIC BLOOD PRESSURE: 70 MMHG | WEIGHT: 130 LBS | HEART RATE: 57 BPM | TEMPERATURE: 96.7 F | OXYGEN SATURATION: 99 % | SYSTOLIC BLOOD PRESSURE: 122 MMHG | HEIGHT: 62 IN | BODY MASS INDEX: 23.92 KG/M2

## 2025-01-29 DIAGNOSIS — M85.88 OSTEOPENIA OF LUMBAR SPINE: ICD-10-CM

## 2025-01-29 DIAGNOSIS — K21.9 GASTROESOPHAGEAL REFLUX DISEASE WITHOUT ESOPHAGITIS: ICD-10-CM

## 2025-01-29 DIAGNOSIS — T46.6X5A MYALGIA DUE TO STATIN: ICD-10-CM

## 2025-01-29 DIAGNOSIS — M79.10 MYALGIA DUE TO STATIN: ICD-10-CM

## 2025-01-29 DIAGNOSIS — E78.5 DYSLIPIDEMIA: ICD-10-CM

## 2025-01-29 SDOH — ECONOMIC STABILITY: FOOD INSECURITY: WITHIN THE PAST 12 MONTHS, THE FOOD YOU BOUGHT JUST DIDN'T LAST AND YOU DIDN'T HAVE MONEY TO GET MORE.: NEVER TRUE

## 2025-01-29 SDOH — ECONOMIC STABILITY: FOOD INSECURITY: HOW HARD IS IT FOR YOU TO PAY FOR THE VERY BASICS LIKE FOOD, HOUSING, MEDICAL CARE, AND HEATING?: NOT HARD AT ALL

## 2025-01-29 SDOH — ECONOMIC STABILITY: FOOD INSECURITY: WITHIN THE PAST 12 MONTHS, YOU WORRIED THAT YOUR FOOD WOULD RUN OUT BEFORE YOU GOT THE MONEY TO BUY MORE.: NEVER TRUE

## 2025-01-29 SDOH — ECONOMIC STABILITY: TRANSPORTATION INSECURITY: IN THE PAST 12 MONTHS, HAS LACK OF TRANSPORTATION KEPT YOU FROM MEDICAL APPOINTMENTS OR FROM GETTING MEDICATIONS?: NO

## 2025-01-29 ASSESSMENT — ACTIVITIES OF DAILY LIVING (ADL)
LACK_OF_TRANSPORTATION: NO
BATHING_REQUIRES_ASSISTANCE: 0

## 2025-01-29 ASSESSMENT — ENCOUNTER SYMPTOMS: GENERAL WELL-BEING: FAIR

## 2025-01-29 ASSESSMENT — FIBROSIS 4 INDEX: FIB4 SCORE: 1.38

## 2025-01-29 ASSESSMENT — PATIENT HEALTH QUESTIONNAIRE - PHQ9: CLINICAL INTERPRETATION OF PHQ2 SCORE: 0

## 2025-01-29 ASSESSMENT — PAIN SCALES - GENERAL: PAINLEVEL_OUTOF10: NO PAIN

## 2025-01-29 NOTE — ASSESSMENT & PLAN NOTE
Stable on Zetia. Records review    Latest Reference Range & Units 03/21/24 06:58   Cholesterol,Tot 100 - 199 mg/dL 208 (H)   Triglycerides 0 - 149 mg/dL 82   HDL >=40 mg/dL 62   LDL <100 mg/dL 130 (H)   (H): Data is abnormally high. Patient unable to tolerate statin.  Cont heart healthy diet and regular exercise. Cont f/u with PCP for ongoing monitoring and medication management

## 2025-01-29 NOTE — ASSESSMENT & PLAN NOTE
Stable on Zetia for dyslipidemia. Patient reports muscle cramps/ leg pains with the use of a statin. Cont f/u with PCP for ongoing monitoring

## 2025-01-29 NOTE — ASSESSMENT & PLAN NOTE
Stable on omeprazole. Symptoms improved with rx. Cont f/u with PCP for ongoing monitoring and medication management

## 2025-01-29 NOTE — ASSESSMENT & PLAN NOTE
Stable. BMD (1/28/25) T -2.2/ Z 0.9 ( L spine) ; T -0.5/ Z 0.9 ( L femur) Patient is taking alendronate. Continue weight bearing exercise. Cont f/u with PCP for ongoing monitoring and medication management.

## 2025-01-29 NOTE — PROGRESS NOTES
Comprehensive Health Assessment Program     Soledad Jordan is a 65 y.o. here for her comprehensive health assessment.    Patient Active Problem List    Diagnosis Date Noted    Advance care planning 10/01/2024    Myalgia due to statin 04/10/2024    Joint pain 04/10/2024    Grief 04/10/2024    Left leg pain 09/01/2023    Dyslipidemia 03/01/2023    Osteopenia of lumbar spine 01/30/2023    Postmenopausal 01/26/2023    Vaginal dryness 01/26/2023    GERD (gastroesophageal reflux disease) 11/14/2022    IGT (impaired glucose tolerance) 11/14/2022    BMI 23.0-23.9, adult 11/14/2022    Primary osteoarthritis of both hands 11/14/2022       Current Outpatient Medications   Medication Sig Dispense Refill    ezetimibe (ZETIA) 10 MG Tab Take 1 Tablet by mouth every day. CHOLESTEROL 90 Tablet 1    alendronate (FOSAMAX) 70 MG Tab Take 1 Tablet by mouth every 7 days. 12 Tablet 3    omeprazole (PRILOSEC) 20 MG delayed-release capsule TAKE 1 CAPSULE BY MOUTH ONCE DAILY 30 MINUTES BEFORE MORNING MEAL 90 Capsule 3    fluconazole (DIFLUCAN) 150 MG tablet Use 1 pill (one dose) as needed for symptoms of yeast infection. May repeat after 3 days if needed. 5 Tablet 0    triamcinolone acetonide (KENALOG) 0.025 % Cream Apply  topically 1 time a day as needed.      NIACIN ER PO Take  by mouth. (Patient not taking: Reported on 11/14/2024)      ondansetron (ZOFRAN ODT) 4 MG TABLET DISPERSIBLE Take 1 Tablet by mouth every 6 hours as needed for Nausea/Vomiting for up to 15 doses. (Patient not taking: Reported on 1/29/2025) 15 Tablet 0     No current facility-administered medications for this visit.          Current supplements as per medication list.     Allergies:   Phenergan [promethazine hcl], Oxycodone-acetaminophen, and Percocet [oxycodone-acetaminophen]  Social History     Tobacco Use    Smoking status: Never    Smokeless tobacco: Never   Vaping Use    Vaping status: Never Used   Substance Use Topics    Alcohol use: No    Drug use: No      Family History   Problem Relation Age of Onset    Cancer Neg Hx     Ovarian Cancer Neg Hx     Tubal Cancer Neg Hx     Peritoneal Cancer Neg Hx     Colorectal Cancer Neg Hx     Breast Cancer Neg Hx     Bilateral Breast Cancer Neg Hx     Diabetes Neg Hx     Heart Disease Neg Hx     Hypertension Neg Hx      Soledad  has a past medical history of Dyslipidemia, GERD (gastroesophageal reflux disease), H/O mammogram (2013), Pap smear for cervical cancer screening (2012), and PUD (peptic ulcer disease).   Past Surgical History:   Procedure Laterality Date    OTHER ORTHOPEDIC SURGERY  01/01/2009    left shoulder    OTHER ORTHOPEDIC SURGERY  01/01/2005    left knee    ABDOMINAL HYSTERECTOMY TOTAL      ovaries/uterus    APPENDECTOMY      CHOLECYSTECTOMY      OTHER ORTHOPEDIC SURGERY Left     bunion left foot       Screening:  In the last six months have you experienced any leakage of urine? No    Depression Screening  Little interest or pleasure in doing things?  0 - not at all  Feeling down, depressed , or hopeless? 0 - not at all  Patient Health Questionnaire Score: 0     If depressive symptoms identified deferred to follow up visit unless specifically addressed in assessment and plan.    Interpretation of PHQ-9 Total Score   Score Severity   1-4 No Depression   5-9 Mild Depression   10-14 Moderate Depression   15-19 Moderately Severe Depression   20-27 Severe Depression    Screening for Cognitive Impairment  Do you or any of your friends or family members have any concern about your memory? No  Three Minute Recall (Leader, Season, Table) 3/3    Fernando clock face with all 12 numbers and set the hands to show 10 minutes after 11.  Yes    Cognitive concerns identified deferred for follow up unless specifically addressed in assessment and plan.    Fall Risk Assessment  Has the patient had two or more falls in the last year or any fall with injury in the last year?  No    Safety Assessment  Do you always wear your seatbelt?      Any changes to home needed to function safely?    Difficulty hearing.     Patient counseled about all safety risks that were identified.    Functional Assessment ADLs  Are there any barriers preventing you from cooking for yourself or meeting nutritional needs?  No.    Are there any barriers preventing you from driving safely or obtaining transportation?  No.    Are there any barriers preventing you from using a telephone or calling for help?  No    Are there any barriers preventing you from shopping?  No.    Are there any barriers preventing you from taking care of your own finances?  No    Are there any barriers preventing you from managing your medications?  No    Are there any barriers preventing you from showering, bathing or dressing yourself? No    Are there any barriers preventing you from doing housework or laundry? No  Are there any barriers preventing you from using the toilet?No  Are you currently engaging in any exercise or physical activity?  Yes. walks    Self-Assessment of Health  What is your perception of your health? Fair    Do you sleep more than six hours a night? No    In the past 7 days, how much did pain keep you from doing your normal work? None    Do you spend quality time with family or friends (virtually or in person)? Yes    Do you usually eat a heart healthy diet that constists of a variety of fruits, vegetables, whole grains and fiber? Yes    Do you eat foods high in fat and/or Fast Food more than three times per week? No    How concerned are you that your medical conditions are not being well managed? Not at all    Are you worried that in the next 2 months, you may not have stable housing that you own, rent, or stay in as part of a household? No      Advance Care Planning  Do you have an Advance Directive, Living Will, Durable Power of , or POLST? Yes  Advance Directive Living Will     is not on file - instructed patient to bring in a copy to scan into their  chart      Health Maintenance Summary            Ordered - Mammogram (Yearly) Ordered on 1/28/2025 01/17/2024  MA-SCREENING MAMMO BILAT W/TOMOSYNTHESIS W/CAD    07/06/2022  MA-DIAGNOSTIC MAMMO BILAT W/TOMOSYNTHESIS W/CAD    06/25/2021  MA-DIAGNOSTIC MAMMO LEFT W/TOMOSYNTHESIS W/O CAD    12/09/2020  MA-SCREENING MAMMO BILAT W/TOMOSYNTHESIS W/O CAD    10/02/2017  MA-SCREEN MAMMO W/CAD-BILAT    Only the first 5 history entries have been loaded, but more history exists.              Annual Wellness Visit (Yearly) Next due on 1/29/2026 01/29/2025  Level of Service: [place] ANNUAL WELLNESS VISIT-INCLUDES PPPS SUBSEQUE*    05/09/2024  Level of Service: [place] ANNUAL WELLNESS VISIT-INCLUDES PPPS SUBSEQUE*    10/11/2023  Visit Dx: Encounter for Medicare annual wellness exam    10/11/2023  Level of Service: [place] INITIAL ANNUAL WELLNESS VISIT-INCLUDES PPPS    09/21/2023  Level of Service: [place] ANNUAL WELLNESS VISIT-INCLUDES PPPS SUBSEQUE*    Only the first 5 history entries have been loaded, but more history exists.              Bone Density Scan (Every 5 Years) Tentatively due on 1/28/2030 01/28/2025  DS-BONE DENSITY STUDY (DEXA)    01/27/2023  DS-BONE DENSITY STUDY (DEXA)              Colorectal Cancer Screening (Colonoscopy - Every 7 Years) Next due on 10/20/2031      10/20/2024  COLONOSCOPY RESULTS    10/20/2024  COLONOSCOPY RESULTS    10/08/2024  COLONOSCOPY RESULTS    06/27/2024  COLONOSCOPY RESULTS    06/27/2024  COLONOSCOPY RESULTS    Only the first 5 history entries have been loaded, but more history exists.              IMM DTaP/Tdap/Td Vaccine (2 - Td or Tdap) Next due on 1/26/2033 01/26/2023  Imm Admin: Tdap Vaccine              Zoster (Shingles) Vaccines (Series Information) Completed      09/17/2021  Imm Admin: Zoster Vaccine Recombinant (RZV) (SHINGRIX)    06/11/2021  Imm Admin: Zoster Vaccine Recombinant (RZV) (SHINGRIX)              Pneumococcal Vaccine: 65+ Years (Series Information) Completed       2023  Imm Admin: Pneumococcal Conjugate Vaccine (PCV20)              Hepatitis C Screening  Completed      2024  Hepatitis C Antibody component of HCV Scrn ( 8066-8232 1xLife)              Influenza Vaccine (Series Information) Completed      2024  Outside Immunization: Influenza, High Dose    10/11/2023  Imm Admin: Influenza Vaccine Adult HD    2022  Imm Admin: Influenza Vaccine Quad Inj (Pf)    10/02/2020  Imm Admin: Influenza Vaccine Quad Recombinant    10/21/2019  Imm Admin: Influenza Vaccine Quad Recombinant    Only the first 5 history entries have been loaded, but more history exists.              Hepatitis A Vaccine (Hep A) (Series Information) Aged Out      No completion history exists for this topic.              Hepatitis B Vaccine (Hep B) (Series Information) Aged Out      No completion history exists for this topic.              HPV Vaccines (Series Information) Aged Out      No completion history exists for this topic.              Polio Vaccine (Inactivated Polio) (Series Information) Aged Out      No completion history exists for this topic.              Meningococcal Immunization (Series Information) Aged Out      No completion history exists for this topic.              Discontinued - COVID-19 Vaccine  Discontinued      2023  Imm Admin: MODERNA BIVALENT BOOSTER SARS-COV-2 VACCINE (6+)    2022  Imm Admin: MODERNA SARS-COV-2 VACCINE (12+)    2021  Imm Admin: MODERNA SARS-COV-2 VACCINE (12+)    2021  Imm Admin: MODERNA SARS-COV-2 VACCINE (12+)    2021  Imm Admin: MODERNA SARS-COV-2 VACCINE (12+)    Only the first 5 history entries have been loaded, but more history exists.                    Patient Care Team:  Sonia Ramirez P.A.-C. as PCP - General (Physician Assistant)  Gerson Cantu, PT, DPT as Physical Therapist (Physical Therapy)      Financial Resource Strain: Low Risk  (2025)    Overall Financial Resource Strain (CARDIA)     " Difficulty of Paying Living Expenses: Not hard at all      Transportation Needs: No Transportation Needs (1/29/2025)    PRAPARE - Transportation     Lack of Transportation (Medical): No     Lack of Transportation (Non-Medical): No      Food Insecurity: No Food Insecurity (1/29/2025)    Hunger Vital Sign     Worried About Running Out of Food in the Last Year: Never true     Ran Out of Food in the Last Year: Never true        Encounter Vitals  Temperature: 35.9 °C (96.7 °F)  Blood Pressure : 122/70  Pulse: (!) 57  Pulse Oximetry: 99 %  Weight: 59 kg (130 lb)  Height: 157.5 cm (5' 2\")  BMI (Calculated): 23.78  Pain Score: No pain     ROS:  No fever, chills, nausea, vomiting, diarrhea, chest pain or shortness of breath. See HPI.    Physical Exam:  Constitutional: NAD  HENMT: NC/AT, EOMI, OP clear, TM's clear, no lymphadenopathy, no thyromegaly.  No JVD.  Cardiovascular: RRR, No murmur  Lungs: CTAB bilat   Extremities:  No edema   Skin: No legions notes  Neurologic: Alert & oriented    Assessment and Plan. The following treatment and monitoring plan is recommended:  BMI 23.0-23.9, adult  Stable. BMI 23.78    Osteopenia of lumbar spine  Stable. BMD (1/28/25) T -2.2/ Z 0.9 ( L spine) ; T -0.5/ Z 0.9 ( L femur) Patient is taking alendronate. Continue weight bearing exercise. Cont f/u with PCP for ongoing monitoring and medication management.    GERD (gastroesophageal reflux disease)  Stable on omeprazole. Symptoms improved with rx. Cont f/u with PCP for ongoing monitoring and medication management      Dyslipidemia  Stable on Zetia. Records review    Latest Reference Range & Units 03/21/24 06:58   Cholesterol,Tot 100 - 199 mg/dL 208 (H)   Triglycerides 0 - 149 mg/dL 82   HDL >=40 mg/dL 62   LDL <100 mg/dL 130 (H)   (H): Data is abnormally high. Patient unable to tolerate statin.  Cont heart healthy diet and regular exercise. Cont f/u with PCP for ongoing monitoring and medication management      Myalgia due to " statin  Stable on Zetia for dyslipidemia. Patient reports muscle cramps/ leg pains with the use of a statin. Cont f/u with PCP for ongoing monitoring       Services suggested: No services needed at this time  Health Care Screening: Age-appropriate preventive services recommended by USPTF and ACIP covered by Medicare were discussed today. Services ordered if indicated and agreed upon by the patient.  Referrals offered: Community-based lifestyle interventions to reduce health risks and promote self-management and wellness, fall prevention, nutrition, physical activity, tobacco-use cessation, weight loss, and mental health services as per orders if indicated.    Discussion today about general wellness and lifestyle habits:    Prevent falls and reduce trip hazards; Cautioned about securing or removing rugs.  Have a working fire alarm and carbon monoxide detector.  Engage in regular physical activity and social activities.    Follow-up: No follow-ups on file.

## 2025-02-05 ENCOUNTER — HOSPITAL ENCOUNTER (OUTPATIENT)
Dept: LAB | Facility: MEDICAL CENTER | Age: 65
End: 2025-02-05
Attending: PHYSICIAN ASSISTANT
Payer: MEDICARE

## 2025-02-05 DIAGNOSIS — R73.02 IGT (IMPAIRED GLUCOSE TOLERANCE): ICD-10-CM

## 2025-02-05 DIAGNOSIS — R53.83 FATIGUE, UNSPECIFIED TYPE: ICD-10-CM

## 2025-02-05 DIAGNOSIS — E78.5 DYSLIPIDEMIA: ICD-10-CM

## 2025-02-05 DIAGNOSIS — E55.9 VITAMIN D DEFICIENCY: ICD-10-CM

## 2025-02-05 LAB
25(OH)D3 SERPL-MCNC: 39 NG/ML (ref 30–100)
ALBUMIN SERPL BCP-MCNC: 4.6 G/DL (ref 3.2–4.9)
ALBUMIN/GLOB SERPL: 1.6 G/DL
ALP SERPL-CCNC: 53 U/L (ref 30–99)
ALT SERPL-CCNC: 14 U/L (ref 2–50)
ANION GAP SERPL CALC-SCNC: 8 MMOL/L (ref 7–16)
AST SERPL-CCNC: 26 U/L (ref 12–45)
BASOPHILS # BLD AUTO: 0.6 % (ref 0–1.8)
BASOPHILS # BLD: 0.03 K/UL (ref 0–0.12)
BILIRUB SERPL-MCNC: 0.4 MG/DL (ref 0.1–1.5)
BUN SERPL-MCNC: 12 MG/DL (ref 8–22)
CALCIUM ALBUM COR SERPL-MCNC: 9 MG/DL (ref 8.5–10.5)
CALCIUM SERPL-MCNC: 9.5 MG/DL (ref 8.5–10.5)
CHLORIDE SERPL-SCNC: 103 MMOL/L (ref 96–112)
CHOLEST SERPL-MCNC: 201 MG/DL (ref 100–199)
CO2 SERPL-SCNC: 26 MMOL/L (ref 20–33)
CREAT SERPL-MCNC: 0.77 MG/DL (ref 0.5–1.4)
EOSINOPHIL # BLD AUTO: 0.07 K/UL (ref 0–0.51)
EOSINOPHIL NFR BLD: 1.5 % (ref 0–6.9)
ERYTHROCYTE [DISTWIDTH] IN BLOOD BY AUTOMATED COUNT: 44.6 FL (ref 35.9–50)
GFR SERPLBLD CREATININE-BSD FMLA CKD-EPI: 84 ML/MIN/1.73 M 2
GLOBULIN SER CALC-MCNC: 2.8 G/DL (ref 1.9–3.5)
GLUCOSE SERPL-MCNC: 100 MG/DL (ref 65–99)
HCT VFR BLD AUTO: 42.3 % (ref 37–47)
HDLC SERPL-MCNC: 67 MG/DL
HGB BLD-MCNC: 13.8 G/DL (ref 12–16)
IMM GRANULOCYTES # BLD AUTO: 0.02 K/UL (ref 0–0.11)
IMM GRANULOCYTES NFR BLD AUTO: 0.4 % (ref 0–0.9)
LDLC SERPL CALC-MCNC: 117 MG/DL
LYMPHOCYTES # BLD AUTO: 1.92 K/UL (ref 1–4.8)
LYMPHOCYTES NFR BLD: 40.3 % (ref 22–41)
MCH RBC QN AUTO: 31.9 PG (ref 27–33)
MCHC RBC AUTO-ENTMCNC: 32.6 G/DL (ref 32.2–35.5)
MCV RBC AUTO: 97.7 FL (ref 81.4–97.8)
MONOCYTES # BLD AUTO: 0.37 K/UL (ref 0–0.85)
MONOCYTES NFR BLD AUTO: 7.8 % (ref 0–13.4)
NEUTROPHILS # BLD AUTO: 2.36 K/UL (ref 1.82–7.42)
NEUTROPHILS NFR BLD: 49.4 % (ref 44–72)
NRBC # BLD AUTO: 0 K/UL
NRBC BLD-RTO: 0 /100 WBC (ref 0–0.2)
PLATELET # BLD AUTO: 319 K/UL (ref 164–446)
PMV BLD AUTO: 9.9 FL (ref 9–12.9)
POTASSIUM SERPL-SCNC: 4.7 MMOL/L (ref 3.6–5.5)
PROT SERPL-MCNC: 7.4 G/DL (ref 6–8.2)
RBC # BLD AUTO: 4.33 M/UL (ref 4.2–5.4)
SODIUM SERPL-SCNC: 137 MMOL/L (ref 135–145)
TRIGL SERPL-MCNC: 83 MG/DL (ref 0–149)
TSH SERPL DL<=0.005 MIU/L-ACNC: 1.92 UIU/ML (ref 0.38–5.33)
WBC # BLD AUTO: 4.8 K/UL (ref 4.8–10.8)

## 2025-02-05 PROCEDURE — 80053 COMPREHEN METABOLIC PANEL: CPT

## 2025-02-05 PROCEDURE — 84443 ASSAY THYROID STIM HORMONE: CPT

## 2025-02-05 PROCEDURE — 80061 LIPID PANEL: CPT

## 2025-02-05 PROCEDURE — 82306 VITAMIN D 25 HYDROXY: CPT

## 2025-02-05 PROCEDURE — 36415 COLL VENOUS BLD VENIPUNCTURE: CPT

## 2025-02-05 PROCEDURE — 83036 HEMOGLOBIN GLYCOSYLATED A1C: CPT

## 2025-02-05 PROCEDURE — 85025 COMPLETE CBC W/AUTO DIFF WBC: CPT

## 2025-02-07 LAB
EST. AVERAGE GLUCOSE BLD GHB EST-MCNC: 131 MG/DL
HBA1C MFR BLD: 6.2 % (ref 4–5.6)

## 2025-02-18 ENCOUNTER — APPOINTMENT (OUTPATIENT)
Dept: MEDICAL GROUP | Facility: PHYSICIAN GROUP | Age: 65
End: 2025-02-18
Payer: MEDICARE

## 2025-02-18 VITALS
DIASTOLIC BLOOD PRESSURE: 70 MMHG | SYSTOLIC BLOOD PRESSURE: 118 MMHG | BODY MASS INDEX: 24.59 KG/M2 | HEART RATE: 65 BPM | HEIGHT: 62 IN | OXYGEN SATURATION: 97 % | WEIGHT: 133.6 LBS | TEMPERATURE: 96.3 F

## 2025-02-18 DIAGNOSIS — R73.03 PREDIABETES: ICD-10-CM

## 2025-02-18 DIAGNOSIS — T46.6X5A MYALGIA DUE TO STATIN: ICD-10-CM

## 2025-02-18 DIAGNOSIS — M85.88 OSTEOPENIA OF LUMBAR SPINE: ICD-10-CM

## 2025-02-18 DIAGNOSIS — M79.10 MYALGIA DUE TO STATIN: ICD-10-CM

## 2025-02-18 DIAGNOSIS — E78.5 DYSLIPIDEMIA: ICD-10-CM

## 2025-02-18 DIAGNOSIS — E55.9 VITAMIN D DEFICIENCY: ICD-10-CM

## 2025-02-18 DIAGNOSIS — Z00.00 WELLNESS EXAMINATION: ICD-10-CM

## 2025-02-18 DIAGNOSIS — N89.8 VAGINAL IRRITATION: ICD-10-CM

## 2025-02-18 PROCEDURE — 3074F SYST BP LT 130 MM HG: CPT | Performed by: PHYSICIAN ASSISTANT

## 2025-02-18 PROCEDURE — 99397 PER PM REEVAL EST PAT 65+ YR: CPT | Performed by: PHYSICIAN ASSISTANT

## 2025-02-18 PROCEDURE — 1170F FXNL STATUS ASSESSED: CPT | Performed by: PHYSICIAN ASSISTANT

## 2025-02-18 PROCEDURE — 3078F DIAST BP <80 MM HG: CPT | Performed by: PHYSICIAN ASSISTANT

## 2025-02-18 RX ORDER — FLUCONAZOLE 150 MG/1
TABLET ORAL
Qty: 5 TABLET | Refills: 0 | Status: SHIPPED | OUTPATIENT
Start: 2025-02-18

## 2025-02-18 ASSESSMENT — ENCOUNTER SYMPTOMS
VOMITING: 0
CHILLS: 0
WEIGHT LOSS: 0
DEPRESSION: 0
SHORTNESS OF BREATH: 0
FALLS: 0
DIZZINESS: 0
MYALGIAS: 0
NERVOUS/ANXIOUS: 0
ABDOMINAL PAIN: 0
COUGH: 0
SORE THROAT: 0
BLURRED VISION: 0
HEADACHES: 0
PALPITATIONS: 0
DIARRHEA: 0
FEVER: 0
DIAPHORESIS: 0
BRUISES/BLEEDS EASILY: 0
NAUSEA: 0
WEAKNESS: 0
ORTHOPNEA: 0

## 2025-02-18 ASSESSMENT — FIBROSIS 4 INDEX: FIB4 SCORE: 1.46

## 2025-02-18 NOTE — ASSESSMENT & PLAN NOTE
Chronic, uncontrolled.  started Fosamax 3/1/2023.  Tolerating/continue fosamax 70mg daily.    DEXA 1/28/2025:  L spine T score -2.2  Left femur T score -0.5    DEXA, 1/2023:  L spine T score -3.0  Left femur T score -0.7

## 2025-02-18 NOTE — ASSESSMENT & PLAN NOTE
Chronic, stable.  A1c: 6.2 (2/2025); 5.5 (3/2024); 5.8 (8/2023); 5.9 (2/2023).  Discussed increasing plant-based foods, decreasing animal-based and processed foods.

## 2025-02-18 NOTE — ASSESSMENT & PLAN NOTE
Chronic, uncontrolled.     Latest Labs:   Lab Results   Component Value Date/Time    CHOLSTRLTOT 201 (H) 02/05/2025 07:39 AM     (H) 02/05/2025 07:39 AM    HDL 67 02/05/2025 07:39 AM    TRIGLYCERIDE 83 02/05/2025 07:39 AM        Medications: Tolerating/continue ezetimibe 10mg    Risk calculator: The 10-year ASCVD risk score (João REY, et al., 2019) is: 5.9%

## 2025-02-18 NOTE — PROGRESS NOTES
SUBJECTIVE:     CC:     HPI:   Soledad presents today with the following:    ASSESSMENT & PLAN by Problem:     Problem   Wellness Examination    No concerns today.     Myalgia Due to Statin    History of statin induced myalgias.      Dyslipidemia    Chronic, uncontrolled.  Improved somewhat with diet/ezetimibe.  Had statin induced myalgias.  Continue with lifestyle changes.  Tolerating/continue ezetimibe 10mg  Consider fish oil and red yeast rice.     Osteopenia of Lumbar Spine    Chronic, uncontrolled.  Improving to osteopenic range.  Started Fosamax 3/1/2023.  Tolerating/continue fosamax 70mg daily.    Recommend:  Vitamin D: 2000IU daily.  Calcium: 500mg twice daily.     Prediabetes    Chronic, stable.   6.2 (2/2025).  Repeat early August 2025.     Bmi 23.0-23.9, Adult (Resolved)       Mammogram: normal 1/28/2025    Return to clinic early August 2025 with preclinic labs.    Recent antibiotic use. Requesting fluconazole.    Return in about 6 months (around 8/18/2025) for lab discussion.    Anticipatory Guidance:  Wear a seat belt in the car.  Keep guns in a gun safe/locked up.  Recommend wearing sunscreen when outside.  Stay active most days of the week.  Eat plenty fruits and vegetables.  Drink plenty of fluid (without caffeine or alcohol).      HPI:     Problem List Items Addressed This Visit       Dyslipidemia    Chronic, uncontrolled.     Latest Labs:   Lab Results   Component Value Date/Time    CHOLSTRLTOT 201 (H) 02/05/2025 07:39 AM     (H) 02/05/2025 07:39 AM    HDL 67 02/05/2025 07:39 AM    TRIGLYCERIDE 83 02/05/2025 07:39 AM        Medications: Tolerating/continue ezetimibe 10mg    Risk calculator: The 10-year ASCVD risk score (João REY, et al., 2019) is: 5.9%            Relevant Orders    Lipid Profile    Myalgia due to statin    Osteopenia of lumbar spine    Chronic, uncontrolled.  started Fosamax 3/1/2023.  Tolerating/continue fosamax 70mg daily.    DEXA 1/28/2025:  L spine T score -2.2  Left  "femur T score -0.5    DEXA, 1/2023:  L spine T score -3.0  Left femur T score -0.7          Prediabetes    Chronic, stable.  A1c: 6.2 (2/2025); 5.5 (3/2024); 5.8 (8/2023); 5.9 (2/2023).  Discussed increasing plant-based foods, decreasing animal-based and processed foods.         Relevant Orders    HEMOGLOBIN A1C    Wellness examination     Other Visit Diagnoses         Vitamin D deficiency        Relevant Orders    VITAMIN D,25 HYDROXY (DEFICIENCY)      Vaginal irritation        Relevant Medications    fluconazole (DIFLUCAN) 150 MG tablet                   ROS:  Review of Systems   Constitutional:  Negative for chills, diaphoresis, fever, malaise/fatigue and weight loss.   HENT:  Negative for hearing loss and sore throat.    Eyes:  Negative for blurred vision.   Respiratory:  Negative for cough and shortness of breath.    Cardiovascular:  Negative for chest pain, palpitations, orthopnea and leg swelling.   Gastrointestinal:  Negative for abdominal pain, diarrhea, nausea and vomiting.   Genitourinary:  Negative for dysuria, frequency, hematuria and urgency.   Musculoskeletal:  Negative for falls, joint pain and myalgias.   Skin:  Negative for rash.   Neurological:  Negative for dizziness, weakness and headaches.   Endo/Heme/Allergies:  Does not bruise/bleed easily.   Psychiatric/Behavioral:  Negative for depression. The patient is not nervous/anxious.        OBJECTIVE:     Exam:  /70 (BP Location: Right arm, Patient Position: Sitting, BP Cuff Size: Adult)   Pulse 65   Temp (!) 35.7 °C (96.3 °F) (Temporal)   Ht 1.575 m (5' 2\")   Wt 60.6 kg (133 lb 9.6 oz)   SpO2 97%   BMI 24.44 kg/m²  Body mass index is 24.44 kg/m².    Physical Exam  Vitals reviewed.   Constitutional:       General: She is not in acute distress.     Appearance: Normal appearance.   HENT:      Head: Normocephalic and atraumatic.      Right Ear: Tympanic membrane, ear canal and external ear normal.      Left Ear: Tympanic membrane, ear canal " and external ear normal.      Mouth/Throat:      Mouth: Mucous membranes are moist.      Pharynx: Oropharynx is clear. No oropharyngeal exudate or posterior oropharyngeal erythema.   Eyes:      Extraocular Movements: Extraocular movements intact.      Conjunctiva/sclera: Conjunctivae normal.      Pupils: Pupils are equal, round, and reactive to light.   Cardiovascular:      Rate and Rhythm: Normal rate and regular rhythm.      Heart sounds: Normal heart sounds.   Pulmonary:      Effort: Pulmonary effort is normal.      Breath sounds: Normal breath sounds.   Abdominal:      General: Abdomen is flat. Bowel sounds are normal. There is no distension.      Palpations: Abdomen is soft. There is no mass.      Tenderness: There is no abdominal tenderness. There is no guarding.   Musculoskeletal:      Cervical back: Normal range of motion and neck supple.   Skin:     General: Skin is warm and dry.   Neurological:      General: No focal deficit present.      Mental Status: She is alert and oriented to person, place, and time.   Psychiatric:         Mood and Affect: Mood normal.         Behavior: Behavior normal.         Judgment: Judgment normal.           CHART REVIEW:     Labs:                     Please note that this dictation was created using voice recognition software. I have made every reasonable attempt to correct obvious errors, but I expect that there are errors of grammar and possibly content that I did not discover before finalizing the note.

## 2025-02-18 NOTE — PATIENT INSTRUCTIONS
Recommend starting the following:    Vitamin D: 2000IU daily.  Calcium: 500mg twice daily.    Fish oil and red yeast rice can help lower your cholesterol.

## 2025-03-10 DIAGNOSIS — M81.0 AGE-RELATED OSTEOPOROSIS WITHOUT CURRENT PATHOLOGICAL FRACTURE: ICD-10-CM

## 2025-03-10 NOTE — TELEPHONE ENCOUNTER
Received request via: Pharmacy    Was the patient seen in the last year in this department? Yes    Does the patient have an active prescription (recently filled or refills available) for medication(s) requested? No    [email or web address]    Does the patient have USP Plus and need 100-day supply? (This applies to ALL medications) Yes, quantity updated to 100 days

## 2025-03-11 RX ORDER — EZETIMIBE 10 MG/1
10 TABLET ORAL
Qty: 100 TABLET | Refills: 1 | Status: SHIPPED | OUTPATIENT
Start: 2025-03-11

## 2025-03-11 RX ORDER — ALENDRONATE SODIUM 70 MG/1
70 TABLET ORAL
Qty: 12 TABLET | Refills: 3 | Status: SHIPPED | OUTPATIENT
Start: 2025-03-11

## 2025-03-13 ENCOUNTER — TELEPHONE (OUTPATIENT)
Dept: PHYSICAL THERAPY | Facility: REHABILITATION | Age: 65
End: 2025-03-13
Payer: MEDICARE

## 2025-03-13 NOTE — OP THERAPY DISCHARGE SUMMARY
Outpatient Physical Therapy  DISCHARGE SUMMARY NOTE      [facility]  [address], [address]  [place] [facility] [address]  Phone:  [phone]  Fax:  [phone]    Date of Visit: 03/13/2025    Patient: Soledad Jordan  YOB: 1960  MRN: [id number]     Referring Provider: Sonia Ramirez P.A.-C.    Referring Diagnosis Left leg pain [M79.605];Chronic left SI joint pain [M53.3, G89.29]             Your patient is being discharged from Physical Therapy with the following comments:   Progress plateau    Comments:  Soledad Jordan has been discharged due to a lapse in care greater than 30 days. Thank you for the opportunity to assist you and your patient.      Limitations Remaining:  Continued pain    Recommendations:  Patient will follow up with PMR    Gerson Cantu, PT, DPT    Date: 3/13/2025

## 2025-04-10 ENCOUNTER — OFFICE VISIT (OUTPATIENT)
Dept: URGENT CARE | Facility: PHYSICIAN GROUP | Age: 65
End: 2025-04-10
Payer: MEDICARE

## 2025-04-10 VITALS
HEIGHT: 63 IN | HEART RATE: 66 BPM | WEIGHT: 134.59 LBS | RESPIRATION RATE: 12 BRPM | BODY MASS INDEX: 23.85 KG/M2 | TEMPERATURE: 97.9 F | DIASTOLIC BLOOD PRESSURE: 64 MMHG | OXYGEN SATURATION: 98 % | SYSTOLIC BLOOD PRESSURE: 116 MMHG

## 2025-04-10 DIAGNOSIS — J06.9 VIRAL URI WITH COUGH: ICD-10-CM

## 2025-04-10 DIAGNOSIS — J98.01 ACUTE BRONCHOSPASM: ICD-10-CM

## 2025-04-10 PROCEDURE — 3074F SYST BP LT 130 MM HG: CPT | Performed by: PHYSICIAN ASSISTANT

## 2025-04-10 PROCEDURE — 94640 AIRWAY INHALATION TREATMENT: CPT | Performed by: PHYSICIAN ASSISTANT

## 2025-04-10 PROCEDURE — 3078F DIAST BP <80 MM HG: CPT | Performed by: PHYSICIAN ASSISTANT

## 2025-04-10 PROCEDURE — 99213 OFFICE O/P EST LOW 20 MIN: CPT | Mod: 25 | Performed by: PHYSICIAN ASSISTANT

## 2025-04-10 PROCEDURE — 1170F FXNL STATUS ASSESSED: CPT | Performed by: PHYSICIAN ASSISTANT

## 2025-04-10 RX ORDER — IPRATROPIUM BROMIDE AND ALBUTEROL SULFATE 2.5; .5 MG/3ML; MG/3ML
3 SOLUTION RESPIRATORY (INHALATION) ONCE
Status: COMPLETED | OUTPATIENT
Start: 2025-04-10 | End: 2025-04-10

## 2025-04-10 RX ORDER — ALBUTEROL SULFATE 90 UG/1
2 INHALANT RESPIRATORY (INHALATION) EVERY 6 HOURS PRN
Qty: 8.5 G | Refills: 0 | Status: SHIPPED | OUTPATIENT
Start: 2025-04-10

## 2025-04-10 RX ORDER — BENZONATATE 100 MG/1
100 CAPSULE ORAL 3 TIMES DAILY PRN
Qty: 60 CAPSULE | Refills: 0 | Status: SHIPPED | OUTPATIENT
Start: 2025-04-10

## 2025-04-10 RX ORDER — METHYLPREDNISOLONE 4 MG/1
TABLET ORAL
Qty: 21 TABLET | Refills: 0 | Status: SHIPPED | OUTPATIENT
Start: 2025-04-10

## 2025-04-10 RX ADMIN — IPRATROPIUM BROMIDE AND ALBUTEROL SULFATE 3 ML: 2.5; .5 SOLUTION RESPIRATORY (INHALATION) at 11:03

## 2025-04-10 ASSESSMENT — ENCOUNTER SYMPTOMS
HEADACHES: 1
COUGH: 1
SPUTUM PRODUCTION: 0
CHILLS: 0
FEVER: 0

## 2025-04-10 ASSESSMENT — FIBROSIS 4 INDEX: FIB4 SCORE: 1.46

## 2025-04-10 NOTE — PROGRESS NOTES
Subjective     Soledad Jordan is a 65 y.o. female who presents with Cough (W/ phlegm pt. States headache from ear up to head , nasal drainage, slight sore throat due to excessive cough, x 6 days. )    PMH:  has a past medical history of Dyslipidemia, GERD (gastroesophageal reflux disease), H/O mammogram (2013), Pap smear for cervical cancer screening (2012), and PUD (peptic ulcer disease).  MEDS:   Current Outpatient Medications:     methylPREDNISolone (MEDROL DOSEPAK) 4 MG Tablet Therapy Pack, Follow schedule on package instructions., Disp: 21 Tablet, Rfl: 0    benzonatate (TESSALON) 100 MG Cap, Take 1 Capsule by mouth 3 times a day as needed for Cough., Disp: 60 Capsule, Rfl: 0    albuterol 108 (90 Base) MCG/ACT Aero Soln inhalation aerosol, Inhale 2 Puffs every 6 hours as needed for Shortness of Breath (spasmodic cough)., Disp: 8.5 g, Rfl: 0    ezetimibe (ZETIA) 10 MG Tab, TAKE ONE TABLET BY MOUTH EVERY DAY, Disp: 100 Tablet, Rfl: 1    alendronate (FOSAMAX) 70 MG Tab, TAKE 1 TABLET BY MOUTH EVERY 7 DAYS, Disp: 12 Tablet, Rfl: 3    omeprazole (PRILOSEC) 20 MG delayed-release capsule, TAKE 1 CAPSULE BY MOUTH ONCE DAILY 30 MINUTES BEFORE MORNING MEAL, Disp: 90 Capsule, Rfl: 3    fluconazole (DIFLUCAN) 150 MG tablet, Use 1 pill (one dose) as needed for symptoms of yeast infection. May repeat after 3 days if needed. (Patient not taking: Reported on 4/10/2025), Disp: 5 Tablet, Rfl: 0    triamcinolone acetonide (KENALOG) 0.025 % Cream, Apply  topically 1 time a day as needed. (Patient not taking: Reported on 4/10/2025), Disp: , Rfl:   ALLERGIES:   Allergies   Allergen Reactions    Phenergan [Promethazine Hcl] Swelling     Of the FACE     Oxycodone-Acetaminophen Unspecified    Percocet [Oxycodone-Acetaminophen] Vomiting     SURGHX:   Past Surgical History:   Procedure Laterality Date    OTHER ORTHOPEDIC SURGERY  01/01/2009    left shoulder    OTHER ORTHOPEDIC SURGERY  01/01/2005    left knee    ABDOMINAL HYSTERECTOMY  "TOTAL      ovaries/uterus    APPENDECTOMY      CHOLECYSTECTOMY      OTHER ORTHOPEDIC SURGERY Left     bunion left foot     SOCHX:  reports that she has never smoked. She has never used smokeless tobacco. She reports that she does not drink alcohol and does not use drugs.  FH: Reviewed with patient, not pertinent to this visit.           Patient presents with:  Cough: W/ only slight amount of phlegm.  Patient states headache from ear up to head from the cough, nasal drainage, slight sore throat due to excessive cough, x 6 days.  Patient states cough is very dry, patient states she can hardly get through a sentence without coughing.  Patient has tried over-the-counter cough cold medications with little relief from the cough.  No other complaints.        Cough  Associated symptoms include ear pain and headaches. Pertinent negatives include no chills or fever.       Review of Systems   Constitutional:  Negative for chills and fever.   HENT:  Positive for congestion and ear pain.    Respiratory:  Positive for cough. Negative for sputum production.    Neurological:  Positive for headaches.   All other systems reviewed and are negative.             Objective     /64 (BP Location: Right arm, Patient Position: Sitting, BP Cuff Size: Adult long)   Pulse 66   Temp 36.6 °C (97.9 °F) (Temporal)   Resp 12   Ht 1.6 m (5' 3\")   Wt 61 kg (134 lb 9.5 oz)   SpO2 98%   BMI 23.84 kg/m²      Physical Exam  Vitals and nursing note reviewed.   Constitutional:       General: She is not in acute distress.     Appearance: Normal appearance. She is well-developed. She is not ill-appearing or toxic-appearing.   HENT:      Head: Normocephalic and atraumatic.      Right Ear: Tympanic membrane normal.      Left Ear: Tympanic membrane normal.      Nose: Nose normal.      Mouth/Throat:      Lips: Pink.      Mouth: Mucous membranes are moist.      Pharynx: Oropharynx is clear. Uvula midline.   Eyes:      Extraocular Movements: " Extraocular movements intact.      Conjunctiva/sclera: Conjunctivae normal.      Pupils: Pupils are equal, round, and reactive to light.   Cardiovascular:      Rate and Rhythm: Normal rate and regular rhythm.      Pulses: Normal pulses.      Heart sounds: Normal heart sounds.   Pulmonary:      Effort: Pulmonary effort is normal.      Breath sounds: Normal breath sounds. No decreased air movement.      Comments: Dry spasmodic cough.  Abdominal:      General: Bowel sounds are normal.      Palpations: Abdomen is soft.   Musculoskeletal:         General: Normal range of motion.      Cervical back: Normal range of motion and neck supple.   Skin:     General: Skin is warm and dry.      Capillary Refill: Capillary refill takes less than 2 seconds.   Neurological:      General: No focal deficit present.      Mental Status: She is alert and oriented to person, place, and time.      Cranial Nerves: No cranial nerve deficit.      Motor: Motor function is intact.      Coordination: Coordination is intact.      Gait: Gait normal.   Psychiatric:         Mood and Affect: Mood normal.                                  Assessment & Plan  Viral URI with cough    Orders:    methylPREDNISolone (MEDROL DOSEPAK) 4 MG Tablet Therapy Pack; Follow schedule on package instructions.    benzonatate (TESSALON) 100 MG Cap; Take 1 Capsule by mouth 3 times a day as needed for Cough.    ipratropium-albuterol (DUONEB) nebulizer solution    albuterol 108 (90 Base) MCG/ACT Aero Soln inhalation aerosol; Inhale 2 Puffs every 6 hours as needed for Shortness of Breath (spasmodic cough).    Acute bronchospasm    Orders:    methylPREDNISolone (MEDROL DOSEPAK) 4 MG Tablet Therapy Pack; Follow schedule on package instructions.    benzonatate (TESSALON) 100 MG Cap; Take 1 Capsule by mouth 3 times a day as needed for Cough.    ipratropium-albuterol (DUONEB) nebulizer solution    albuterol 108 (90 Base) MCG/ACT Aero Soln inhalation aerosol; Inhale 2 Puffs every 6  hours as needed for Shortness of Breath (spasmodic cough).         Patient HPI physical exam consistent with viral URI causing acute bronchospasm causing dry cough.  Patient responded well to nebulizer treatment in clinic, we discussed that I could prescribe an albuterol inhaler which is handheld rather than her having to purchase the machine.  Patient states she would like to do the handheld albuterol inhaler.  I have also sent a prescription for benzonatate as well as Medrol Dosepak.      No evidence of bacterial infection at this time, no antibiotics are indicated.    Patient can continue taking over-the-counter cough cold medications as desired.    Differential diagnosis, supportive care, and indications for immediate follow-up discussed with patient.  Instructed to return to clinic or nearest emergency department for any change in condition, further concerns, or worsening of symptoms.    I personally reviewed prior external notes and test results pertinent to today's visit.  I have independently reviewed and interpreted all diagnostics ordered during this urgent care visit.    PT should follow up with PCP in 1-2 days for re-evaluation if symptoms have not improved.      Discussed red flags and reasons to return to UC or ED.      Pt and/or family verbalized understanding of diagnosis and follow up instructions and was offered informational handout on diagnosis.  PT discharged.     Please note that this dictation was created using voice recognition software. I have made every reasonable attempt to correct obvious errors, but I expect that there may be errors of grammar and possibly content that I did not discover before finalizing the note.

## 2025-05-09 DIAGNOSIS — K21.9 GASTROESOPHAGEAL REFLUX DISEASE WITHOUT ESOPHAGITIS: ICD-10-CM

## 2025-05-12 RX ORDER — OMEPRAZOLE 20 MG/1
CAPSULE, DELAYED RELEASE ORAL
Qty: 100 CAPSULE | Refills: 3 | Status: SHIPPED | OUTPATIENT
Start: 2025-05-12

## 2025-05-12 NOTE — TELEPHONE ENCOUNTER
Received request via: Pharmacy    Was the patient seen in the last year in this department? Yes    Does the patient have an active prescription (recently filled or refills available) for medication(s) requested? No    Pharmacy Name: [id number] - [place], [place] - [address].     Does the patient have detention Plus and need 100-day supply? (This applies to ALL medications) Yes, quantity updated to 100 days

## 2025-08-12 ENCOUNTER — HOSPITAL ENCOUNTER (OUTPATIENT)
Dept: LAB | Facility: MEDICAL CENTER | Age: 65
End: 2025-08-12
Attending: PHYSICIAN ASSISTANT
Payer: MEDICARE

## 2025-08-12 DIAGNOSIS — E78.5 DYSLIPIDEMIA: ICD-10-CM

## 2025-08-12 DIAGNOSIS — E55.9 VITAMIN D DEFICIENCY: ICD-10-CM

## 2025-08-12 DIAGNOSIS — R73.03 PREDIABETES: ICD-10-CM

## 2025-08-12 LAB
25(OH)D3 SERPL-MCNC: 55 NG/ML (ref 30–100)
CHOLEST SERPL-MCNC: 182 MG/DL (ref 100–199)
EST. AVERAGE GLUCOSE BLD GHB EST-MCNC: 117 MG/DL
HBA1C MFR BLD: 5.7 % (ref 4–5.6)
HDLC SERPL-MCNC: 66 MG/DL
LDLC SERPL CALC-MCNC: 92 MG/DL
TRIGL SERPL-MCNC: 121 MG/DL (ref 0–149)

## 2025-08-12 PROCEDURE — 36415 COLL VENOUS BLD VENIPUNCTURE: CPT

## 2025-08-12 PROCEDURE — 82306 VITAMIN D 25 HYDROXY: CPT

## 2025-08-12 PROCEDURE — 80061 LIPID PANEL: CPT

## 2025-08-12 PROCEDURE — 83036 HEMOGLOBIN GLYCOSYLATED A1C: CPT

## 2025-08-21 ENCOUNTER — OFFICE VISIT (OUTPATIENT)
Dept: MEDICAL GROUP | Facility: PHYSICIAN GROUP | Age: 65
End: 2025-08-21
Payer: MEDICARE

## 2025-08-21 VITALS
OXYGEN SATURATION: 100 % | TEMPERATURE: 98.3 F | HEART RATE: 66 BPM | HEIGHT: 63 IN | BODY MASS INDEX: 24.31 KG/M2 | RESPIRATION RATE: 10 BRPM | DIASTOLIC BLOOD PRESSURE: 70 MMHG | WEIGHT: 137.2 LBS | SYSTOLIC BLOOD PRESSURE: 140 MMHG

## 2025-08-21 DIAGNOSIS — R19.5 CHANGE IN STOOL: ICD-10-CM

## 2025-08-21 DIAGNOSIS — R73.03 PREDIABETES: ICD-10-CM

## 2025-08-21 DIAGNOSIS — E55.9 VITAMIN D DEFICIENCY: ICD-10-CM

## 2025-08-21 DIAGNOSIS — E78.5 DYSLIPIDEMIA: Primary | ICD-10-CM

## 2025-08-21 DIAGNOSIS — R53.83 FATIGUE, UNSPECIFIED TYPE: ICD-10-CM

## 2025-08-21 PROBLEM — Z00.00 WELLNESS EXAMINATION: Status: RESOLVED | Noted: 2025-02-18 | Resolved: 2025-08-21

## 2025-08-21 PROCEDURE — 1170F FXNL STATUS ASSESSED: CPT | Performed by: PHYSICIAN ASSISTANT

## 2025-08-21 PROCEDURE — 3077F SYST BP >= 140 MM HG: CPT | Performed by: PHYSICIAN ASSISTANT

## 2025-08-21 PROCEDURE — 3078F DIAST BP <80 MM HG: CPT | Performed by: PHYSICIAN ASSISTANT

## 2025-08-21 PROCEDURE — 99214 OFFICE O/P EST MOD 30 MIN: CPT | Performed by: PHYSICIAN ASSISTANT

## 2025-08-21 ASSESSMENT — ENCOUNTER SYMPTOMS
FEVER: 0
CHILLS: 0
SHORTNESS OF BREATH: 0

## 2025-08-21 ASSESSMENT — FIBROSIS 4 INDEX: FIB4 SCORE: 1.46
